# Patient Record
Sex: FEMALE | Race: WHITE | Employment: FULL TIME | URBAN - METROPOLITAN AREA
[De-identification: names, ages, dates, MRNs, and addresses within clinical notes are randomized per-mention and may not be internally consistent; named-entity substitution may affect disease eponyms.]

---

## 2017-03-13 ENCOUNTER — NURSE ONLY (OUTPATIENT)
Dept: FAMILY MEDICINE CLINIC | Age: 42
End: 2017-03-13

## 2017-03-13 DIAGNOSIS — Z11.1 PPD SCREENING TEST: Primary | ICD-10-CM

## 2017-03-13 PROCEDURE — 86580 TB INTRADERMAL TEST: CPT | Performed by: FAMILY MEDICINE

## 2017-03-15 ENCOUNTER — OFFICE VISIT (OUTPATIENT)
Dept: FAMILY MEDICINE CLINIC | Age: 42
End: 2017-03-15

## 2017-03-15 VITALS
HEIGHT: 64 IN | DIASTOLIC BLOOD PRESSURE: 74 MMHG | TEMPERATURE: 101.9 F | RESPIRATION RATE: 24 BRPM | OXYGEN SATURATION: 96 % | WEIGHT: 240 LBS | HEART RATE: 102 BPM | SYSTOLIC BLOOD PRESSURE: 122 MMHG | BODY MASS INDEX: 40.97 KG/M2

## 2017-03-15 DIAGNOSIS — K50.90 CROHN'S DISEASE WITHOUT COMPLICATION, UNSPECIFIED GASTROINTESTINAL TRACT LOCATION (HCC): ICD-10-CM

## 2017-03-15 DIAGNOSIS — J11.1 FLU: Primary | ICD-10-CM

## 2017-03-15 DIAGNOSIS — K76.0 FATTY LIVER: ICD-10-CM

## 2017-03-15 LAB
INDURATION: 0
INFLUENZA A ANTIBODY: ABNORMAL
INFLUENZA B ANTIBODY: POSITIVE
TB SKIN TEST: NORMAL

## 2017-03-15 PROCEDURE — 87804 INFLUENZA ASSAY W/OPTIC: CPT | Performed by: INTERNAL MEDICINE

## 2017-03-15 PROCEDURE — 99214 OFFICE O/P EST MOD 30 MIN: CPT | Performed by: INTERNAL MEDICINE

## 2017-03-15 RX ORDER — BENZONATATE 100 MG/1
CAPSULE ORAL
Qty: 90 CAPSULE | Refills: 0 | Status: SHIPPED | OUTPATIENT
Start: 2017-03-15 | End: 2017-12-24

## 2017-03-15 RX ORDER — OSELTAMIVIR PHOSPHATE 75 MG/1
CAPSULE ORAL
Qty: 10 CAPSULE | Refills: 0 | Status: SHIPPED | OUTPATIENT
Start: 2017-03-15 | End: 2017-05-10

## 2017-05-10 ENCOUNTER — OFFICE VISIT (OUTPATIENT)
Dept: FAMILY MEDICINE CLINIC | Age: 42
End: 2017-05-10

## 2017-05-10 VITALS
BODY MASS INDEX: 41.32 KG/M2 | HEART RATE: 78 BPM | SYSTOLIC BLOOD PRESSURE: 136 MMHG | DIASTOLIC BLOOD PRESSURE: 78 MMHG | HEIGHT: 64 IN | WEIGHT: 242 LBS

## 2017-05-10 DIAGNOSIS — E66.01 MORBID OBESITY DUE TO EXCESS CALORIES (HCC): ICD-10-CM

## 2017-05-10 DIAGNOSIS — R51.9 ACUTE INTRACTABLE HEADACHE, UNSPECIFIED HEADACHE TYPE: Primary | ICD-10-CM

## 2017-05-10 DIAGNOSIS — K50.90 CROHN'S DISEASE WITHOUT COMPLICATION, UNSPECIFIED GASTROINTESTINAL TRACT LOCATION (HCC): ICD-10-CM

## 2017-05-10 PROCEDURE — 99214 OFFICE O/P EST MOD 30 MIN: CPT | Performed by: FAMILY MEDICINE

## 2017-06-15 ENCOUNTER — TELEPHONE (OUTPATIENT)
Dept: FAMILY MEDICINE CLINIC | Age: 42
End: 2017-06-15

## 2017-06-15 DIAGNOSIS — Z00.00 WELL ADULT EXAM: Primary | ICD-10-CM

## 2017-12-24 RX ORDER — LEVOFLOXACIN 500 MG/1
500 TABLET, FILM COATED ORAL DAILY
Qty: 10 TABLET | Refills: 0 | Status: SHIPPED | OUTPATIENT
Start: 2017-12-24 | End: 2018-01-03

## 2017-12-24 RX ORDER — BENZONATATE 200 MG/1
200 CAPSULE ORAL 3 TIMES DAILY PRN
Qty: 20 CAPSULE | Refills: 0 | Status: SHIPPED | OUTPATIENT
Start: 2017-12-24 | End: 2017-12-31

## 2018-05-14 ENCOUNTER — TELEPHONE (OUTPATIENT)
Dept: FAMILY MEDICINE CLINIC | Age: 43
End: 2018-05-14

## 2018-08-09 ENCOUNTER — PATIENT MESSAGE (OUTPATIENT)
Dept: FAMILY MEDICINE CLINIC | Age: 43
End: 2018-08-09

## 2018-08-15 ENCOUNTER — TELEPHONE (OUTPATIENT)
Dept: ORTHOPEDIC SURGERY | Age: 43
End: 2018-08-15

## 2018-10-08 RX ORDER — TRETINOIN 0.25 MG/G
CREAM TOPICAL
Qty: 20 G | Refills: 3 | Status: SHIPPED | OUTPATIENT
Start: 2018-10-08 | End: 2018-12-26 | Stop reason: SDUPTHER

## 2018-10-08 NOTE — TELEPHONE ENCOUNTER
[facility]     Last Office Visit- 5/10/17     Pending Office Visit- none

## 2020-02-12 ENCOUNTER — TELEPHONE (OUTPATIENT)
Dept: PRIMARY CARE CLINIC | Age: 45
End: 2020-02-12

## 2020-02-12 NOTE — TELEPHONE ENCOUNTER
Please call: I have not seen her in quite some time and she had labs drawn that were sent to me that require follow-up. I see no future appointments.     Please ask her to follow-up with me or with a physician at the dent office on her abnormal labs in the next couple weeks

## 2020-02-18 ENCOUNTER — OFFICE VISIT (OUTPATIENT)
Dept: PRIMARY CARE CLINIC | Age: 45
End: 2020-02-18
Payer: COMMERCIAL

## 2020-02-18 VITALS
HEART RATE: 86 BPM | WEIGHT: 252 LBS | DIASTOLIC BLOOD PRESSURE: 74 MMHG | SYSTOLIC BLOOD PRESSURE: 126 MMHG | BODY MASS INDEX: 43.02 KG/M2 | HEIGHT: 64 IN

## 2020-02-18 DIAGNOSIS — R73.09 ELEVATED GLUCOSE: ICD-10-CM

## 2020-02-18 LAB
CHOLESTEROL, TOTAL: 184 MG/DL (ref 0–199)
HBA1C MFR BLD: 6.1 %
HDLC SERPL-MCNC: 43 MG/DL (ref 40–60)
LDL CHOLESTEROL CALCULATED: 110 MG/DL
TRIGL SERPL-MCNC: 155 MG/DL (ref 0–150)
VLDLC SERPL CALC-MCNC: 31 MG/DL

## 2020-02-18 PROCEDURE — 99396 PREV VISIT EST AGE 40-64: CPT | Performed by: FAMILY MEDICINE

## 2020-02-18 PROCEDURE — 83036 HEMOGLOBIN GLYCOSYLATED A1C: CPT | Performed by: FAMILY MEDICINE

## 2020-02-18 RX ORDER — MERCAPTOPURINE 50 MG/1
50 TABLET ORAL
COMMUNITY
Start: 2020-01-17 | End: 2021-04-26

## 2020-02-18 RX ORDER — TRIAMCINOLONE ACETONIDE 1 MG/G
CREAM TOPICAL
COMMUNITY
Start: 2020-01-31 | End: 2021-04-26

## 2020-02-18 ASSESSMENT — ENCOUNTER SYMPTOMS
ABDOMINAL PAIN: 0
COUGH: 0
NAUSEA: 0
EYE PAIN: 0
CONSTIPATION: 0
SHORTNESS OF BREATH: 0
SORE THROAT: 0
RHINORRHEA: 0
DIARRHEA: 0
VOMITING: 0
COLOR CHANGE: 0

## 2020-02-18 ASSESSMENT — PATIENT HEALTH QUESTIONNAIRE - PHQ9
SUM OF ALL RESPONSES TO PHQ9 QUESTIONS 1 & 2: 0
SUM OF ALL RESPONSES TO PHQ QUESTIONS 1-9: 0
1. LITTLE INTEREST OR PLEASURE IN DOING THINGS: 0
SUM OF ALL RESPONSES TO PHQ QUESTIONS 1-9: 0
2. FEELING DOWN, DEPRESSED OR HOPELESS: 0

## 2020-02-18 NOTE — PATIENT INSTRUCTIONS
If you want to feel better these are the FUNDAMENTAL PILLARS of Wellness:    Make it EASY to do the RIGHT THINGS. 1)  You can choose to Get 150 min/week of moderate exercise (can talk but can't sing) or 75 min/week of vigorous exercise (can't talk)   This will enhance your sense of well being (Exercise is as good as medicine for depression.)    2)  You can choose to Get 7-9 hours of sleep per night    Detoxifies your brain, reduces risk of dementia    3)  You can choose to Strength Train 2 x a week on non-consecutive days   This will improve function and reduce risk of injury. Body weight type exercises such as Yoga and Pilates are good    4)  You can choose good nutrition. Only eat your goal weight (in lbs) x 10 calories/day and get 5 servings of Vegetables/day   Plant based diets reduce risk of heart attack/stroke and will help you feel full on less food. Avoid highly processed foods and processed carbohydrates. 5)  You can choose moderate alcohol intake < 1-2 drinks/day   Alcohol will disrupt your sleep and add calories to your day    6)  You can choose to develop a Charismatic/Supportive relationship. This will strengthen your resilience for the ups and downs. 7)  You can choose to Practice Mindfulness. An hour a day of prayer/meditation/gratitude will change your life! Here are some recommendations for weight loss:  Check into The GI Diet or \"Primal diet\", Intermittent fasting. Take your desired weight in pounds and multiply by 10 and that is your average daily calorie allowance. For example if you wish to weigh 170 lb x 10 = 1700 sandie/day (this is how to gradually lose the weight and maintain your desired weight). Avoid soda/coke and all \"wet carbs\" => Drink ice water instead    Drink a large glass of ice water before meals and EAT SLOWLY (talk while you eat)! Rethink your hunger => it means your losing weight. Minimize carbohydrates as they stimulate your appetite.   Avoid Bread, Rice, Pasta and Potatoes      Avoid eating calories after 6 pm  Try taking your calories only in 6 hours of the day - fast the rest of the day

## 2020-12-28 ENCOUNTER — TELEPHONE (OUTPATIENT)
Dept: PRIMARY CARE CLINIC | Age: 45
End: 2020-12-28

## 2020-12-28 NOTE — TELEPHONE ENCOUNTER
----- Message from Inocencia Taylor sent at 12/28/2020 12:04 PM EST -----  Subject: Message to Provider    QUESTIONS  Information for Provider? patient wants to receive monoclonal antibody  ---------------------------------------------------------------------------  --------------  CALL BACK INFO  What is the best way for the office to contact you? OK to leave message on   voicemail  Preferred Call Back Phone Number? [phone]  ---------------------------------------------------------------------------  --------------  SCRIPT ANSWERS  Relationship to Patient?  Self

## 2020-12-29 ENCOUNTER — VIRTUAL VISIT (OUTPATIENT)
Dept: PRIMARY CARE CLINIC | Age: 45
End: 2020-12-29
Payer: COMMERCIAL

## 2020-12-29 PROCEDURE — 99213 OFFICE O/P EST LOW 20 MIN: CPT | Performed by: FAMILY MEDICINE

## 2020-12-29 ASSESSMENT — ENCOUNTER SYMPTOMS
ABDOMINAL PAIN: 0
DIARRHEA: 0
NAUSEA: 0
COUGH: 1
VOMITING: 0
SHORTNESS OF BREATH: 0
CONSTIPATION: 0

## 2021-04-20 ENCOUNTER — PATIENT MESSAGE (OUTPATIENT)
Dept: PRIMARY CARE CLINIC | Age: 46
End: 2021-04-20

## 2021-04-20 NOTE — TELEPHONE ENCOUNTER
From: Amy Linn  To: Matthew Johnson MD  Sent: 4/20/2021 8:00 AM EDT  Subject: Visit Aaron Armendariz, and I are all coming in for our physicals. [facility] and I are scheduled Monday 4/26. Kiana Ro is 5/4. Do you want us to pre-complete blood work prior to our visit?     Thanks,  Wendie Marvin

## 2021-04-26 ENCOUNTER — OFFICE VISIT (OUTPATIENT)
Dept: PRIMARY CARE CLINIC | Age: 46
End: 2021-04-26
Payer: COMMERCIAL

## 2021-04-26 VITALS
SYSTOLIC BLOOD PRESSURE: 128 MMHG | TEMPERATURE: 98.1 F | HEART RATE: 74 BPM | WEIGHT: 230.2 LBS | BODY MASS INDEX: 40.79 KG/M2 | DIASTOLIC BLOOD PRESSURE: 78 MMHG | HEIGHT: 63 IN

## 2021-04-26 DIAGNOSIS — K50.90 CROHN'S DISEASE WITHOUT COMPLICATION, UNSPECIFIED GASTROINTESTINAL TRACT LOCATION (HCC): ICD-10-CM

## 2021-04-26 DIAGNOSIS — Z82.49 FAMILY HISTORY OF CORONARY ARTERY DISEASE: ICD-10-CM

## 2021-04-26 DIAGNOSIS — Z13.220 SCREENING CHOLESTEROL LEVEL: ICD-10-CM

## 2021-04-26 DIAGNOSIS — Z00.00 WELL ADULT EXAM: Primary | ICD-10-CM

## 2021-04-26 DIAGNOSIS — R07.9 CHEST PAIN, UNSPECIFIED TYPE: ICD-10-CM

## 2021-04-26 DIAGNOSIS — R73.09 ELEVATED GLUCOSE: ICD-10-CM

## 2021-04-26 DIAGNOSIS — E66.01 MORBID OBESITY DUE TO EXCESS CALORIES (HCC): ICD-10-CM

## 2021-04-26 PROCEDURE — 93000 ELECTROCARDIOGRAM COMPLETE: CPT | Performed by: FAMILY MEDICINE

## 2021-04-26 PROCEDURE — 99396 PREV VISIT EST AGE 40-64: CPT | Performed by: FAMILY MEDICINE

## 2021-04-26 PROCEDURE — 99214 OFFICE O/P EST MOD 30 MIN: CPT | Performed by: FAMILY MEDICINE

## 2021-04-26 RX ORDER — ERYTHROMYCIN 20 MG/ML
SOLUTION TOPICAL
COMMUNITY
Start: 2020-09-02 | End: 2021-09-02

## 2021-04-26 RX ORDER — AZITHROMYCIN 250 MG/1
500 TABLET, FILM COATED ORAL DAILY
COMMUNITY

## 2021-04-26 ASSESSMENT — ENCOUNTER SYMPTOMS
EYE PAIN: 0
SORE THROAT: 0
VOMITING: 0
ABDOMINAL PAIN: 0
SHORTNESS OF BREATH: 0
CONSTIPATION: 0
RHINORRHEA: 0
COLOR CHANGE: 0
NAUSEA: 0
COUGH: 0
DIARRHEA: 0

## 2021-04-26 ASSESSMENT — PATIENT HEALTH QUESTIONNAIRE - PHQ9
1. LITTLE INTEREST OR PLEASURE IN DOING THINGS: 0
SUM OF ALL RESPONSES TO PHQ QUESTIONS 1-9: 0
2. FEELING DOWN, DEPRESSED OR HOPELESS: 0

## 2021-04-26 NOTE — PATIENT INSTRUCTIONS
appropriate for everybody. ..  good online sources include Noom (more social with daily check ins), Lifesum (similar but less social) and Naturally slim, as well as Brandneu ($1500)    The GI Diet or \"Primal diet\", Intermittent fasting can also be effective choices. If you have diabetes treated with insulin be sure to ask me for specific guidance around meals. Take your desired weight in pounds and multiply by 10 and that is your average daily calorie allowance. For example if you wish to weigh 170 lb x 10 = 1700 sandie/day (this is how to gradually lose the weight and maintain your desired weight). Avoid soda/coke and all \"wet carbs\" => Drink ice water instead    Drink a large glass of ice water before meals and EAT SLOWLY (talk while you eat)! Rethink your hunger => it means your losing weight. Minimize highly processed carbohydrates as they stimulate your appetite:  Specifically cut back on Bread, Rice, Pasta and Potatoes    Avoid eating calories after 6 pm      Just How Much of a Benefit Do We Get from a Healthful Lifestyle? Honey Beckett MD reviewing Rosemarie Apple al. BMJ 2020 Jan 8  A long-term analysis suggests that adopting such a lifestyle at midlife might add as long as 10 years of disease-free life. Virtually everyone knows that a healthful lifestyle  never smoking, normal body-mass index (BMI), moderate-to-vigorous physical activity, moderate alcohol intake, and a higher-quality diet  is good for their health. What very few people know is just how much benefit they get from achieving all these lifestyle goals. A [place] team examined data from about 111,000 people at age 48 and followed them prospectively for as long as 34 years. Healthful lifestyle factors were measured repeatedly and systematically, and development of various diseases and death were recorded. The primary endpoint was life expectancy free from diabetes, cardiovascular diseases, and cancer.      Women who met all the healthful lifestyle measures had an additional 10.7 years of disease-free life compared with women who met no healthful lifestyle measures. For men, the number was 7.6 additional disease-free years.

## 2021-04-26 NOTE — PROGRESS NOTES
Chief Complaint   Patient presents with    Annual Exam       Vandana Rodríguez presents for evaluation and management of all adult exam and follow-up on obesity elevated glucose Crohn's and the sudden cardiac death of her 15-year-old brother. Dharmesh Candelario notes that she is walking about 10,000 steps a day. She does not do any formal strength training but is remodeling a home with her . She gets about 7 hours asleep at night. She very rarely drinks any alcohol. She gets about 5-6 servings of vegetables a day and she has excellent social support. She is upset today as her brother who is only 46years old, recently had a sudden cardiac death. She notes that she has a paternal uncle who also had sudden cardiac death. She has been working on weight loss and has affected a 20 pound weight loss through diet and increased activity. She continues to follow with Dr. Jennifer Ma for Crohn's disease which is stable on just Remicade. Notes no blurred vision or other medical problems today. She has a history of elevated glucose but was unable to get a point-of-care hemoglobin test today due to low hemoglobin. Review of Systems   Constitutional: Negative for chills and fever. HENT: Negative for ear pain, rhinorrhea and sore throat. Eyes: Negative for pain and visual disturbance. Respiratory: Negative for cough and shortness of breath. Cardiovascular: Positive for chest pain. Negative for palpitations. Gastrointestinal: Negative for abdominal pain, constipation, diarrhea, nausea and vomiting. Genitourinary: Negative for dysuria and frequency. Musculoskeletal: Negative for joint swelling and myalgias. Skin: Negative for color change and rash. Neurological: Negative for weakness, numbness and headaches. Hematological: Negative for adenopathy. Does not bruise/bleed easily. Psychiatric/Behavioral: Negative for dysphoric mood, self-injury and suicidal ideas.  The patient is not nervous/anxious. Allergies   Allergen Reactions    Minocycline Hives    Oxycodone-Acetaminophen Itching    Pcn [Penicillins] Hives    Codeine Rash     New Prescriptions    No medications on file     Current Outpatient Medications   Medication Sig Dispense Refill    azithromycin (ZITHROMAX) 250 MG tablet Take 500 mg by mouth daily Indications: one for 3 consecutive days, for 12 weeks      hydrocortisone 2.5 % cream APPLY TOPICALLY TO AFFECTED AREA(S) TWO TIMES A DAY FOR 14 DAYS OR UNTIL IMPROVED 28 g 2    ibuprofen (ADVIL;MOTRIN) 200 MG tablet Take 200 mg by mouth every 6 hours as needed for Pain      inFLIXimab (REMICADE) 100 MG injection Infuse 5 mg/kg intravenously See Admin Instructions.  erythromycin with ethanol (THERAMYCIN) 2 % external solution Apply to face bid starting in .  triamcinolone (KENALOG) 0.1 % cream by Intratympanic route       No current facility-administered medications for this visit.         Past Medical History:   Diagnosis Date    Allergic rhinitis     COVID-19 2020    Crohn disease ([facility] [address]     Dr. Butch Santiago liver     with elevated LFT - [facility]    Morbid obesity due to excess calories ([facility] [address] 10/21/2016    Trigger thumb of right hand 2014     Past Surgical History:   Procedure Laterality Date     SECTION      x2    ENDOMETRIAL ABLATION      Dr. Steve Jackson EXTRACTION       Family History   Problem Relation Age of Onset    High Blood Pressure Mother     Cancer Mother         BCC per PT     Diabetes Brother     Heart Attack Brother     Cancer Maternal Grandmother         uterine, in [de-identified]    Heart Disease Maternal Grandfather     Pancreatic Cancer Father         Stage IV:  BRCA    No Known Problems Son         Comp Science/Engrg    Pancreatic Cancer Paternal Grandmother      Social History     Tobacco Use    Smoking status: Former Smoker     Packs/day: 0.50     Years: 5.00 Pack years: 2.50     Quit date: 1999     Years since quittin.4    Smokeless tobacco: Never Used   Substance Use Topics    Alcohol use: Yes     Comment: occasional    Drug use: No       Objective   /78   Pulse 74   Temp 98.1 °F (36.7 °C) (Temporal)   Ht 5' 3\" (1.6 m)   Wt 230 lb 3.2 oz (104.4 kg)   Breastfeeding No   BMI 40.78 kg/m²   Wt Readings from Last 3 Encounters:   21 230 lb 3.2 oz (104.4 kg)   20 252 lb (114.3 kg)   05/10/17 242 lb (109.8 kg)       Physical Exam  Constitutional:       Appearance: She is well-developed. She is obese. HENT:      Head: Normocephalic and atraumatic. Nose: Nose normal.      Mouth/Throat:      Pharynx: No oropharyngeal exudate. Eyes:      General: No scleral icterus. Right eye: No discharge. Left eye: No discharge. Pupils: Pupils are equal, round, and reactive to light. Neck:      Musculoskeletal: Normal range of motion and neck supple. Thyroid: No thyromegaly. Cardiovascular:      Rate and Rhythm: Normal rate and regular rhythm. Pulses:           Dorsalis pedis pulses are 2+ on the right side and 2+ on the left side. Posterior tibial pulses are 2+ on the right side and 2+ on the left side. Heart sounds: Normal heart sounds. No murmur. No friction rub. No gallop. Comments: No Edema Lower Extremities  Pulmonary:      Effort: Pulmonary effort is normal.      Breath sounds: Normal breath sounds. No wheezing or rales. Abdominal:      General: Bowel sounds are normal. There is no distension. Palpations: Abdomen is soft. There is no hepatomegaly or splenomegaly. Tenderness: There is no abdominal tenderness. There is no guarding or rebound. Musculoskeletal: Normal range of motion. General: No tenderness or deformity. Lymphadenopathy:      Cervical: No cervical adenopathy. Skin:     General: Skin is warm and dry. Findings: No erythema or rash.    Neurological: Mental Status: She is alert. Cranial Nerves: No cranial nerve deficit. Sensory: No sensory deficit. Gait: Gait normal.   Psychiatric:         Speech: Speech normal.         Behavior: Behavior normal.           Chemistry        Component Value Date/Time     02/11/2020 1445    K 3.7 02/11/2020 1445     02/11/2020 1445    CO2 27 02/11/2020 1445    BUN 11 02/11/2020 1445    CREATININE 0.77 02/11/2020 1445        Component Value Date/Time    CALCIUM 9.1 02/11/2020 1445    ALKPHOS 67 02/11/2020 1445    AST 23 02/11/2020 1445    ALT 35 02/11/2020 1445    BILITOT 0.4 02/11/2020 1445          Lab Results   Component Value Date    WBC 10.0 02/11/2020    HGB 12.1 02/11/2020    HCT 37.3 02/11/2020    MCV 81.3 (L) 02/11/2020     (H) 02/11/2020     Lab Results   Component Value Date    LABA1C 6.1 02/18/2020     No results found for: EAG  Lab Results   Component Value Date    LABA1C 6.1 02/18/2020     No components found for: CHLPL  Lab Results   Component Value Date    TRIG 155 (H) 02/18/2020    TRIG 121 09/22/2016    TRIG 115 11/11/2011     Lab Results   Component Value Date    HDL 43 02/18/2020    HDL 40 09/22/2016    HDL 54 11/11/2011     Lab Results   Component Value Date    LDLCALC 110 (H) 02/18/2020    LDLCALC 108 09/22/2016    LDLCALC 121 (H) 11/11/2011     Lab Results   Component Value Date    LABVLDL 31 02/18/2020    LABVLDL 23 11/11/2011     ECG: Normal sinus rhythm with low voltage otherwise normal    Assessment   Plan   1. Well adult exam: Appears well. Set goals for healthcare and gave counseling on lifestyle  2. Morbid obesity due to excess calories ([address]  Counseled on diet and exercise  3. Crohn's disease without complication, unspecified gastrointestinal tract location ([address]  Stable: Continue follow-up with GI and meds  4. Elevated glucose  -     We will check lab based hemoglobin A1c due to history of elevated glucose Hemoglobin A1C; Future  5.  Screening cholesterol

## 2021-04-29 DIAGNOSIS — R73.09 ELEVATED GLUCOSE: ICD-10-CM

## 2021-04-29 DIAGNOSIS — Z13.220 SCREENING CHOLESTEROL LEVEL: ICD-10-CM

## 2021-04-29 LAB
A/G RATIO: 1.5 (ref 1.1–2.2)
ALBUMIN SERPL-MCNC: 4.1 G/DL (ref 3.4–5)
ALP BLD-CCNC: 67 U/L (ref 40–129)
ALT SERPL-CCNC: 14 U/L (ref 10–40)
ANION GAP SERPL CALCULATED.3IONS-SCNC: 12 MMOL/L (ref 3–16)
AST SERPL-CCNC: 19 U/L (ref 15–37)
BILIRUB SERPL-MCNC: 0.7 MG/DL (ref 0–1)
BUN BLDV-MCNC: 12 MG/DL (ref 7–20)
CALCIUM SERPL-MCNC: 8.8 MG/DL (ref 8.3–10.6)
CHLORIDE BLD-SCNC: 101 MMOL/L (ref 99–110)
CHOLESTEROL, TOTAL: 142 MG/DL (ref 0–199)
CO2: 24 MMOL/L (ref 21–32)
CREAT SERPL-MCNC: 0.9 MG/DL (ref 0.6–1.1)
GFR AFRICAN AMERICAN: >60
GFR NON-AFRICAN AMERICAN: >60
GLOBULIN: 2.7 G/DL
GLUCOSE BLD-MCNC: 83 MG/DL (ref 70–99)
HDLC SERPL-MCNC: 40 MG/DL (ref 40–60)
LDL CHOLESTEROL CALCULATED: 74 MG/DL
POTASSIUM SERPL-SCNC: 4 MMOL/L (ref 3.5–5.1)
SODIUM BLD-SCNC: 137 MMOL/L (ref 136–145)
TOTAL PROTEIN: 6.8 G/DL (ref 6.4–8.2)
TRIGL SERPL-MCNC: 139 MG/DL (ref 0–150)
VLDLC SERPL CALC-MCNC: 28 MG/DL

## 2021-04-30 LAB
ESTIMATED AVERAGE GLUCOSE: 105.4 MG/DL
HBA1C MFR BLD: 5.3 %

## 2021-05-06 ENCOUNTER — HOSPITAL ENCOUNTER (OUTPATIENT)
Dept: CT IMAGING | Age: 46
Discharge: HOME | End: 2021-05-06
Payer: COMMERCIAL

## 2021-05-06 DIAGNOSIS — R07.9 CHEST PAIN, UNSPECIFIED TYPE: ICD-10-CM

## 2021-05-06 DIAGNOSIS — Z82.49 FAMILY HISTORY OF CORONARY ARTERY DISEASE: ICD-10-CM

## 2021-05-06 PROCEDURE — 75571 CT HRT W/O DYE W/CA TEST: CPT

## 2021-09-20 ENCOUNTER — OFFICE VISIT (OUTPATIENT)
Dept: PRIMARY CARE CLINIC | Age: 46
End: 2021-09-20
Payer: COMMERCIAL

## 2021-09-20 VITALS
DIASTOLIC BLOOD PRESSURE: 82 MMHG | SYSTOLIC BLOOD PRESSURE: 119 MMHG | HEIGHT: 63 IN | HEART RATE: 72 BPM | BODY MASS INDEX: 36.86 KG/M2 | WEIGHT: 208 LBS

## 2021-09-20 DIAGNOSIS — Z82.49 FAMILY HISTORY OF CORONARY ARTERY DISEASE: ICD-10-CM

## 2021-09-20 DIAGNOSIS — L71.9 ROSACEA: ICD-10-CM

## 2021-09-20 DIAGNOSIS — K50.90 CROHN'S DISEASE WITHOUT COMPLICATION, UNSPECIFIED GASTROINTESTINAL TRACT LOCATION (HCC): Primary | ICD-10-CM

## 2021-09-20 DIAGNOSIS — E66.01 MORBID OBESITY DUE TO EXCESS CALORIES (HCC): ICD-10-CM

## 2021-09-20 PROCEDURE — 99214 OFFICE O/P EST MOD 30 MIN: CPT | Performed by: FAMILY MEDICINE

## 2021-09-20 RX ORDER — AZELAIC ACID 0.15 G/G
GEL TOPICAL
COMMUNITY
Start: 2021-07-09 | End: 2021-09-20

## 2021-09-20 ASSESSMENT — PATIENT HEALTH QUESTIONNAIRE - PHQ9
SUM OF ALL RESPONSES TO PHQ QUESTIONS 1-9: 0
SUM OF ALL RESPONSES TO PHQ QUESTIONS 1-9: 0
SUM OF ALL RESPONSES TO PHQ9 QUESTIONS 1 & 2: 0
1. LITTLE INTEREST OR PLEASURE IN DOING THINGS: 0
2. FEELING DOWN, DEPRESSED OR HOPELESS: 0
SUM OF ALL RESPONSES TO PHQ QUESTIONS 1-9: 0

## 2021-09-20 NOTE — PROGRESS NOTES
Chief Complaint   Patient presents with    Crohn's Disease    Obesity    Rosacea       HPI: Dena Gaston presents for evaluation and management of obesity, Crohn's and rosacea. Dena Gaston notes that she has been on azithromycin for 2 months now for her rosacea and it is much better. She continues to follow Dr. Leonor Pacheco for her Crohn's disease. She notes a sensation of something stuck in her throat. She admits that she only takes her pills with a sip of water. She has been working on diet and exercise and continues to lose weight. We reviewed her coronary calcium score which was normal and cholesterol was good    Today's PHQ:    PHQ Scores 9/20/2021 4/26/2021 2/18/2020   PHQ2 Score 0 0 0   PHQ9 Score 0 0 0     Interpretation of Total Score Depression Severity: 1-4 = Minimal depression, 5-9 = Mild depression, 10-14 = Moderate depression, 15-19 = Moderately severe depression, 20-27 = Severe depression        Review of Systems    Allergies   Allergen Reactions    Minocycline Hives    Oxycodone-Acetaminophen Itching    Pcn [Penicillins] Hives    Codeine Rash     New Prescriptions    No medications on file     Current Outpatient Medications   Medication Sig Dispense Refill    azithromycin (ZITHROMAX) 250 MG tablet Take 500 mg by mouth daily Indications: one for 3 consecutive days, for 12 weeks      ibuprofen (ADVIL;MOTRIN) 200 MG tablet Take 200 mg by mouth every 6 hours as needed for Pain      inFLIXimab (REMICADE) 100 MG injection Infuse 5 mg/kg intravenously See Admin Instructions.  Azelaic Acid 15 % GEL Apply to face bid 20-30 minutes after washing face (Patient not taking: Reported on 9/20/2021)      hydrocortisone 2.5 % cream APPLY TOPICALLY TO AFFECTED AREA(S) TWO TIMES A DAY FOR 14 DAYS OR UNTIL IMPROVED (Patient not taking: Reported on 9/20/2021) 28 g 2     No current facility-administered medications for this visit.        Past Medical History:   Diagnosis Date    Allergic rhinitis     COVID-19 12/28/2020    Crohn disease ([facility] [address]     Dr. Donneta Holter liver     with elevated LFT - [facility]    Morbid obesity due to excess calories ([facility] 10/21/2016    Trigger thumb of right hand 11/12/2014         Objective   /82   Pulse 72   Ht 5' 3\" (1.6 m)   Wt 208 lb (94.3 kg)   BMI 36.85 kg/m²   Wt Readings from Last 3 Encounters:   09/20/21 208 lb (94.3 kg)   04/26/21 230 lb 3.2 oz (104.4 kg)   02/18/20 252 lb (114.3 kg)       Physical Exam  Constitutional:       Appearance: She is well-developed. She is obese. Cardiovascular:      Rate and Rhythm: Normal rate and regular rhythm. Pulses:           Dorsalis pedis pulses are 2+ on the right side and 2+ on the left side. Posterior tibial pulses are 2+ on the right side and 2+ on the left side. Heart sounds: No murmur heard. No friction rub. No gallop. Comments: No Lower Extremity Edema  Pulmonary:      Effort: Pulmonary effort is normal.      Breath sounds: Normal breath sounds. No wheezing or rales. Abdominal:      General: Bowel sounds are normal. There is no distension. Palpations: Abdomen is soft. There is no mass. Tenderness: There is no abdominal tenderness. Skin:     General: Skin is warm and dry. Findings: No rash.       Comments: Few scattered acneiform lesions on face           Chemistry        Component Value Date/Time     04/29/2021 0726    K 4.0 04/29/2021 0726     04/29/2021 0726    CO2 24 04/29/2021 0726    BUN 12 04/29/2021 0726    CREATININE 0.9 04/29/2021 0726        Component Value Date/Time    CALCIUM 8.8 04/29/2021 0726    ALKPHOS 67 04/29/2021 0726    AST 19 04/29/2021 0726    ALT 14 04/29/2021 0726    BILITOT 0.7 04/29/2021 0726          Lab Results   Component Value Date    WBC 10.0 02/11/2020    HGB 12.1 02/11/2020    HCT 37.3 02/11/2020    MCV 81.3 (L) 02/11/2020     (H) 02/11/2020     Lab Results   Component Value Date    LABA1C 5.3 04/29/2021     Lab Results   Component Value Date    .4 04/29/2021     Lab Results   Component Value Date    LABA1C 5.3 04/29/2021     No components found for: CHLPL  Lab Results   Component Value Date    TRIG 139 04/29/2021    TRIG 155 (H) 02/18/2020    TRIG 121 09/22/2016     Lab Results   Component Value Date    HDL 40 04/29/2021    HDL 43 02/18/2020    HDL 40 09/22/2016     Lab Results   Component Value Date    LDLCALC 74 04/29/2021    LDLCALC 110 (H) 02/18/2020    LDLCALC 108 09/22/2016     Lab Results   Component Value Date    LABVLDL 28 04/29/2021    LABVLDL 31 02/18/2020    LABVLDL 23 11/11/2011         Assessment   Plan   1. Crohn's disease without complication, unspecified gastrointestinal tract location [facility])  Patient is getting her labs checked in the Connect system. We reviewed those. I asked patient to mention to Dr. Dusty Kramer her symptoms of dysphagia    2. Morbid obesity due to excess calories ([address]  Praised efforts at diet and weight loss    3. Family history of coronary artery disease  Reviewed coronary calcium score which was normal    4. Rosacea  Improved: Reviewed her labs for drug monitoring. If she has not had an EKG by the time we see her back we will check an EKG in 6 months     Discussed use, benefit, and side effects of prescribed medications. Barriers to medication compliance addressed. All patient questions answered. Pt voiced understanding. RTC Return in about 6 months (around 3/20/2022).

## 2022-02-01 LAB — HCT VFR BLD CALC: 39.2 % (ref 36–46)

## 2022-03-23 ENCOUNTER — PATIENT MESSAGE (OUTPATIENT)
Dept: PRIMARY CARE CLINIC | Age: 47
End: 2022-03-23

## 2022-03-23 DIAGNOSIS — Z13.220 SCREENING CHOLESTEROL LEVEL: Primary | ICD-10-CM

## 2022-03-23 NOTE — TELEPHONE ENCOUNTER
From: Duc Hernandez  To: Dr. Nilo Whitman: 3/23/2022 12:50 PM EDT  Subject: Pre physical blood work    Hi Dr Dayana Soni,    Can you order lab work prior to our 4/7 annual physical visit.

## 2022-04-02 DIAGNOSIS — Z13.220 SCREENING CHOLESTEROL LEVEL: ICD-10-CM

## 2022-04-02 LAB
A/G RATIO: 1.4 (ref 1.1–2.2)
ALBUMIN SERPL-MCNC: 4.4 G/DL (ref 3.4–5)
ALP BLD-CCNC: 65 U/L (ref 40–129)
ALT SERPL-CCNC: 12 U/L (ref 10–40)
ANION GAP SERPL CALCULATED.3IONS-SCNC: 14 MMOL/L (ref 3–16)
AST SERPL-CCNC: 16 U/L (ref 15–37)
BILIRUB SERPL-MCNC: 0.5 MG/DL (ref 0–1)
BUN BLDV-MCNC: 17 MG/DL (ref 7–20)
CALCIUM SERPL-MCNC: 9.3 MG/DL (ref 8.3–10.6)
CHLORIDE BLD-SCNC: 105 MMOL/L (ref 99–110)
CHOLESTEROL, TOTAL: 178 MG/DL (ref 0–199)
CO2: 20 MMOL/L (ref 21–32)
CREAT SERPL-MCNC: 0.8 MG/DL (ref 0.6–1.1)
GFR AFRICAN AMERICAN: >60
GFR NON-AFRICAN AMERICAN: >60
GLUCOSE BLD-MCNC: 94 MG/DL (ref 70–99)
HDLC SERPL-MCNC: 43 MG/DL (ref 40–60)
LDL CHOLESTEROL CALCULATED: 116 MG/DL
POTASSIUM SERPL-SCNC: 4.2 MMOL/L (ref 3.5–5.1)
SODIUM BLD-SCNC: 139 MMOL/L (ref 136–145)
TOTAL PROTEIN: 7.5 G/DL (ref 6.4–8.2)
TRIGL SERPL-MCNC: 97 MG/DL (ref 0–150)
VLDLC SERPL CALC-MCNC: 19 MG/DL

## 2022-04-04 NOTE — RESULT ENCOUNTER NOTE
Good Morning Jose Angel ,    Thanks for getting your labs drawn in preparation for our visit later this week. Your lab results are back and they look pretty good. Your cholesterol increased significantly, so likely want to talk about that just a little bit. Keep up the good work!     I look forward to seeing you in a couple days,    Dr. Lorenzo Rosas      For your convenience I have listed your future appointment(s) below:  Future Appointments  4/7/2022   4:00 PM    Cirilo Campos MD        [address] [facility]

## 2022-04-06 PROBLEM — E11.65 UNCONTROLLED TYPE 2 DIABETES MELLITUS WITH HYPERGLYCEMIA (HCC): Status: ACTIVE | Noted: 2022-04-06

## 2022-04-07 ENCOUNTER — OFFICE VISIT (OUTPATIENT)
Dept: PRIMARY CARE CLINIC | Age: 47
End: 2022-04-07
Payer: COMMERCIAL

## 2022-04-07 VITALS
HEART RATE: 84 BPM | WEIGHT: 217 LBS | BODY MASS INDEX: 38.44 KG/M2 | DIASTOLIC BLOOD PRESSURE: 82 MMHG | TEMPERATURE: 98 F | SYSTOLIC BLOOD PRESSURE: 129 MMHG

## 2022-04-07 DIAGNOSIS — Z13.1 SCREENING FOR DIABETES MELLITUS: ICD-10-CM

## 2022-04-07 DIAGNOSIS — Z00.00 ENCOUNTER FOR WELL ADULT EXAM WITHOUT ABNORMAL FINDINGS: ICD-10-CM

## 2022-04-07 DIAGNOSIS — E66.01 MORBID OBESITY DUE TO EXCESS CALORIES (HCC): ICD-10-CM

## 2022-04-07 DIAGNOSIS — Z00.00 WELL ADULT EXAM: Primary | ICD-10-CM

## 2022-04-07 LAB — HBA1C MFR BLD: NORMAL %

## 2022-04-07 PROCEDURE — 83036 HEMOGLOBIN GLYCOSYLATED A1C: CPT | Performed by: FAMILY MEDICINE

## 2022-04-07 PROCEDURE — 99396 PREV VISIT EST AGE 40-64: CPT | Performed by: FAMILY MEDICINE

## 2022-04-07 ASSESSMENT — ENCOUNTER SYMPTOMS
COLOR CHANGE: 0
RHINORRHEA: 0
SORE THROAT: 0
NAUSEA: 0
COUGH: 0
ABDOMINAL PAIN: 0
CONSTIPATION: 0
EYE PAIN: 0
DIARRHEA: 0
VOMITING: 0
SHORTNESS OF BREATH: 0

## 2022-04-07 ASSESSMENT — PATIENT HEALTH QUESTIONNAIRE - PHQ9
SUM OF ALL RESPONSES TO PHQ QUESTIONS 1-9: 0
2. FEELING DOWN, DEPRESSED OR HOPELESS: 0
SUM OF ALL RESPONSES TO PHQ9 QUESTIONS 1 & 2: 0
SUM OF ALL RESPONSES TO PHQ QUESTIONS 1-9: 0
1. LITTLE INTEREST OR PLEASURE IN DOING THINGS: 0
SUM OF ALL RESPONSES TO PHQ QUESTIONS 1-9: 0
SUM OF ALL RESPONSES TO PHQ QUESTIONS 1-9: 0

## 2022-04-07 NOTE — PROGRESS NOTES
Well Adult Note  Name: Ashely Necessary Date: 2022   MRN: [id number] Sex: Female   Age: 51 y.o. Ethnicity: Non- / Non    : 1975 Race: White (non-)      Ca Britton is here for well adult exam.  History:  She notes she is working out at [employer] 4 times a week for an hour per episode. She is getting 7-1/2 hours of sleep a night. She hardly drinks any alcohol and eats about 6 servings of vegetables a day. She continues to follow with her gastroenterologist Dr. Fausto Byrne for her Crohn's disease which she states is well controlled. She thinks she is lost about 15 pounds with her exercise and nutrition. She is pleased with those results. She had an elevated hemoglobin A1c in her chart that she states is clearly an erroneous entry. Review of Systems   Constitutional: Negative for chills and fever. HENT: Negative for ear pain, rhinorrhea and sore throat. Eyes: Negative for pain and visual disturbance. Respiratory: Negative for cough and shortness of breath. Cardiovascular: Negative for chest pain and palpitations. Gastrointestinal: Negative for abdominal pain, constipation, diarrhea, nausea and vomiting. Genitourinary: Negative for dysuria and frequency. Musculoskeletal: Negative for joint swelling and myalgias. Skin: Negative for color change and rash. Neurological: Negative for weakness, numbness and headaches. Hematological: Negative for adenopathy. Does not bruise/bleed easily. Psychiatric/Behavioral: Negative for dysphoric mood, self-injury and suicidal ideas. The patient is not nervous/anxious. Allergies   Allergen Reactions    Minocycline Hives    Oxycodone-Acetaminophen Itching    Pcn [Penicillins] Hives    Codeine Rash       Prior to Visit Medications    Medication Sig Taking?  Authorizing Provider   azithromycin (ZITHROMAX) 250 MG tablet Take 500 mg by mouth daily Indications: one for 3 consecutive days, for 12 weeks Yes Historical Provider, MD   ibuprofen (ADVIL;MOTRIN) 200 MG tablet Take 200 mg by mouth every 6 hours as needed for Pain Yes Historical Provider, MD   inFLIXimab (REMICADE) 100 MG injection Infuse 5 mg/kg intravenously See Admin Instructions. Yes Historical Provider, MD       Past Medical History:   Diagnosis Date    Allergic rhinitis     COVID-19 2020    Crohn disease ([facility] [address] [address]     Dr. Jayden Garcia liver     with elevated LFT - [place]    Morbid obesity due to excess calories ([facility] [address] 10/21/2016    Rosacea 2021    Treating with zithromax since 2021    Trigger thumb of right hand 2014    Uncontrolled type 2 diabetes mellitus with hyperglycemia ([facility] [address] 2022       Past Surgical History:   Procedure Laterality Date     SECTION      x2   Camialexandra Barahona      Dr. Tomer Baez EXTRACTION         Family History   Problem Relation Age of Onset    High Blood Pressure Mother     Cancer Mother         BCC per PT     Diabetes Brother     Heart Attack Brother     Cancer Maternal Grandmother         uterine, in [de-identified]    Heart Disease Maternal Grandfather     Pancreatic Cancer Father         Stage IV:  BRCA    No Known Problems Son         Comp Science/Engrg    Pancreatic Cancer Paternal Grandmother        Social History     Tobacco Use    Smoking status: Former Smoker     Packs/day: 0.50     Years: 5.00     Pack years: 2.50     Quit date: 1999     Years since quittin.4    Smokeless tobacco: Never Used   Substance Use Topics    Alcohol use: Yes     Comment: occasional    Drug use: No       Objective   /82   Pulse 84   Temp 98 °F (36.7 °C) (Temporal)   Wt 217 lb (98.4 kg)   Breastfeeding No   BMI 38.44 kg/m²   Wt Readings from Last 3 Encounters:   22 217 lb (98.4 kg)   21 208 lb (94.3 kg)   21 230 lb 3.2 oz (104.4 kg)     There were no vitals filed for this visit.       Physical Exam  Constitutional:       Appearance: She is well-developed. HENT:      Head: Normocephalic and atraumatic. Nose: Nose normal.      Mouth/Throat:      Pharynx: No oropharyngeal exudate. Eyes:      General: No scleral icterus. Right eye: No discharge. Left eye: No discharge. Pupils: Pupils are equal, round, and reactive to light. Neck:      Thyroid: No thyromegaly. Cardiovascular:      Rate and Rhythm: Normal rate and regular rhythm. Pulses:           Dorsalis pedis pulses are 2+ on the right side and 2+ on the left side. Posterior tibial pulses are 2+ on the right side and 2+ on the left side. Heart sounds: Normal heart sounds. No murmur heard. No friction rub. No gallop. Comments: No Edema Lower Extremities  Pulmonary:      Effort: Pulmonary effort is normal.      Breath sounds: Normal breath sounds. No wheezing or rales. Abdominal:      General: Bowel sounds are normal. There is no distension. Palpations: Abdomen is soft. There is no hepatomegaly or splenomegaly. Tenderness: There is no abdominal tenderness. There is no guarding or rebound. Musculoskeletal:         General: No tenderness or deformity. Normal range of motion. Cervical back: Normal range of motion and neck supple. Lymphadenopathy:      Cervical: No cervical adenopathy. Skin:     General: Skin is warm and dry. Findings: No erythema or rash. Neurological:      Mental Status: She is alert. Cranial Nerves: No cranial nerve deficit. Sensory: No sensory deficit. Gait: Gait normal.   Psychiatric:         Speech: Speech normal.         Behavior: Behavior normal.           Assessment   Plan   1. Well adult exam  Appears well:  Counselled diet, development, anticipatory guidance and safety issues. 2. Encounter for well adult exam without abnormal findings  As above    3. Morbid obesity due to excess calories ([address]  Counseled on nutrition and exercise. Follow-up yearly and as needed    4. Screening for diabetes mellitus  Repeat hemoglobin A1c was 5.3. Clearly the previous entry is an error we will work to have it removed  - POCT glycosylated hemoglobin (Hb A1C)      Personalized Preventive Plan   Current Health Maintenance Status  Immunization History   Administered Date(s) Administered    COVID-19, Pfizer Purple top, DILUTE for use, 12+ yrs, 30mcg/0.3mL dose 03/27/2021, 04/16/2021, 12/17/2021    Influenza 11/11/2011    Influenza Vaccine, unspecified formulation 09/22/2016    Influenza Virus Vaccine 10/20/2008, 10/05/2009, 11/04/2014, 10/05/2015    Influenza, Quadv, IM, PF (6 mo and older Fluzone, Flulaval, Fluarix, and 3 yrs and older Afluria) 11/06/2018    PPD Test 09/03/2013, 09/15/2014, 09/22/2014, 08/12/2015, 03/13/2017    Pneumococcal Conjugate 13-valent Hammad Glassing) 11/04/2014    Tdap (Boostrix, Adacel) 10/21/2008        Health Maintenance   Topic Date Due    Hepatitis C screen  Never done    Diabetic foot exam  Never done    HIV screen  Never done    Diabetic microalbuminuria test  Never done    Diabetic retinal exam  Never done    Hepatitis B vaccine (1 of 3 - Risk 3-dose series) Never done    DTaP/Tdap/Td vaccine (2 - Td or Tdap) 10/21/2018    Cervical cancer screen  09/19/2021    A1C test (Diabetic or Prediabetic)  05/01/2022    Flu vaccine (Season Ended) 09/01/2022    Depression Screen  09/20/2022    Lipid screen  04/02/2023    Colorectal Cancer Screen  12/12/2026    Pneumococcal 0-64 years Vaccine (2 of 2 - PPSV23) 07/10/2039    COVID-19 Vaccine  Completed    Hepatitis A vaccine  Aged Out    Hib vaccine  Aged Out    Meningococcal (ACWY) vaccine  Aged Out     Recommendations for Dispatch Due: see orders and patient instructions/AVS.    Return in 1 year (on 4/7/2023).

## 2022-05-10 NOTE — PATIENT INSTRUCTIONS
Send in your and [facility] will:  [address] MD  [address], [place]  Ph: [phone]  Fax: [phone]    WELL ADULT LIFESTYLE INSTRUCTIONS:    Jose Roberto Wilson a day in the next week to spend an hour reviewing the information below then:     1) determine your health goals for the year   2) determine what changes you need to achieve those goals   3) design your daily routine, shopping habits etc to implement those changes        Default Right Action (no choices)       Make it EASY to do the RIGHT THINGS. 4) I invite you to send me your plans via [employer] so I can continue to help you       with them    Examine your lifestyle with an emphasis on BARRIERS to bad and good habits and how you can design your life to make better choices. If you want to feel better these are the FUNDAMENTAL PILLARS of Wellness:    1)  You can choose to Get 150 min/week of moderate exercise (can talk but can't        sing) or 75 min/week of vigorous exercise (can't talk). This will enhance your sense of well being (Exercise is as good as medicine for   depression.)    2)  You can choose to Get 7-9 hours of sleep per night    Detoxifies your brain, reduces risk of dementia    3)  You can choose to Strength Train 2 x a week on non-consecutive days   This will improve function and reduce risk of injury. Body weight type exercises   such as Yoga and Pilates are excellent choices. 4)  You can choose Good Nutrition. Only eat your goal weight (in lbs) x 10        calories/day and get 5 servings of Vegetables/day   Plant based diets reduce risk of heart attack/stroke and will help you feel full on   less food. Avoid highly processed foods and processed carbohydrates. 5)  You can choose Moderate alcohol intake < 1-2 drinks/day   Alcohol will disrupt your sleep and add calories to your day. 6)  You can choose to Develop a Charismatic/Supportive relationship. 75 colon cancer, s/p kevin with colostomy, DM2, now with L foot and ankle swelling and pain since yesterday, atraumatic.  No fever, rash, sob, chest pain.  + scar at junction of ankle/foot where 'old ulcer healed' at same location.  Edema to foot and ankle.  No skin redness.  + warmth.  Unable to palpate pulses but Vacular consulted and obtained doppler signals.  Nl cap refill.  Plan labs, U/S and reassess. This will strengthen your resilience for the ups and downs. 7)  You can choose to Practice Mindfulness. An hour a day of prayer/meditation/gratitude will change your life! If you are trying to lose weight, here are some recommendations for weight loss:  Not every weight loss program is appropriate for everybody. ..  good online sources include Noom (more social with daily check ins), Lifesum (similar but less social) and Naturally slim, as well as Brandneu ($1500)    The GI Diet or \"Primal diet\", Intermittent fasting can also be effective choices. If you have diabetes treated with insulin be sure to ask for specific guidance around meals. Take your desired weight in pounds and multiply by 10 and that is your average daily calorie allowance. For example if you wish to weigh 170 lb x 10 = 1700 sandie/day (this is how to gradually lose the weight and maintain your desired weight). Avoid soda/coke and all \"wet carbs\" => Drink ice water instead    Drink a large glass of ice water before meals and EAT SLOWLY (talk while you eat)! Rethink your hunger => it means your losing weight.     Minimize highly processed carbohydrates as they stimulate your appetite:  Specifically cut back on Bread, Rice, Pasta and Potatoes    Avoid eating calories after 6 pm

## 2022-07-10 ENCOUNTER — PATIENT MESSAGE (OUTPATIENT)
Dept: PRIMARY CARE CLINIC | Age: 47
End: 2022-07-10

## 2022-07-11 NOTE — TELEPHONE ENCOUNTER
From: Selina Blue  To: Dr. Rosana Slaughter: 7/10/2022 4:03 PM EDT  Subject:  Biometrics Results    Just sending to attach to my file. Had bloodwork done for an insurance program through work.

## 2023-03-25 ENCOUNTER — PATIENT MESSAGE (OUTPATIENT)
Dept: PRIMARY CARE CLINIC | Age: 48
End: 2023-03-25

## 2023-04-07 ENCOUNTER — OFFICE VISIT (OUTPATIENT)
Dept: PRIMARY CARE CLINIC | Age: 48
End: 2023-04-07
Payer: COMMERCIAL

## 2023-04-07 VITALS
HEIGHT: 63 IN | BODY MASS INDEX: 34.41 KG/M2 | HEART RATE: 73 BPM | SYSTOLIC BLOOD PRESSURE: 116 MMHG | DIASTOLIC BLOOD PRESSURE: 72 MMHG | WEIGHT: 194.2 LBS | TEMPERATURE: 97.3 F

## 2023-04-07 DIAGNOSIS — Z00.00 ENCOUNTER FOR WELL ADULT EXAM WITHOUT ABNORMAL FINDINGS: Primary | ICD-10-CM

## 2023-04-07 PROCEDURE — 99396 PREV VISIT EST AGE 40-64: CPT | Performed by: FAMILY MEDICINE

## 2023-04-07 SDOH — ECONOMIC STABILITY: FOOD INSECURITY: WITHIN THE PAST 12 MONTHS, THE FOOD YOU BOUGHT JUST DIDN'T LAST AND YOU DIDN'T HAVE MONEY TO GET MORE.: NEVER TRUE

## 2023-04-07 SDOH — ECONOMIC STABILITY: FOOD INSECURITY: WITHIN THE PAST 12 MONTHS, YOU WORRIED THAT YOUR FOOD WOULD RUN OUT BEFORE YOU GOT MONEY TO BUY MORE.: NEVER TRUE

## 2023-04-07 SDOH — ECONOMIC STABILITY: HOUSING INSECURITY
IN THE LAST 12 MONTHS, WAS THERE A TIME WHEN YOU DID NOT HAVE A STEADY PLACE TO SLEEP OR SLEPT IN A SHELTER (INCLUDING NOW)?: NO

## 2023-04-07 SDOH — ECONOMIC STABILITY: INCOME INSECURITY: HOW HARD IS IT FOR YOU TO PAY FOR THE VERY BASICS LIKE FOOD, HOUSING, MEDICAL CARE, AND HEATING?: NOT HARD AT ALL

## 2023-04-07 ASSESSMENT — ENCOUNTER SYMPTOMS
ABDOMINAL PAIN: 0
NAUSEA: 0
SHORTNESS OF BREATH: 0
RHINORRHEA: 0
COUGH: 0
CONSTIPATION: 0
COLOR CHANGE: 0
EYE PAIN: 0
DIARRHEA: 0
VOMITING: 0
SORE THROAT: 0

## 2023-04-07 ASSESSMENT — PATIENT HEALTH QUESTIONNAIRE - PHQ9
SUM OF ALL RESPONSES TO PHQ QUESTIONS 1-9: 0
SUM OF ALL RESPONSES TO PHQ9 QUESTIONS 1 & 2: 0
SUM OF ALL RESPONSES TO PHQ QUESTIONS 1-9: 0
SUM OF ALL RESPONSES TO PHQ QUESTIONS 1-9: 0
2. FEELING DOWN, DEPRESSED OR HOPELESS: 0
1. LITTLE INTEREST OR PLEASURE IN DOING THINGS: 0
SUM OF ALL RESPONSES TO PHQ QUESTIONS 1-9: 0

## 2023-04-07 NOTE — PROGRESS NOTES
mcg/0.3 mL 10/03/2022    COVID-19, PFIZER GRAY top, DO NOT Dilute, (age 15 y+), IM, 30 mcg/0.3 mL 05/02/2022    COVID-19, PFIZER PURPLE top, DILUTE for use, (age 15 y+), 30mcg/0.3mL 03/27/2021, 04/16/2021, 12/17/2021    Influenza 11/11/2011    Influenza Vaccine, unspecified formulation 09/22/2016    Influenza Virus Vaccine 10/20/2008, 10/05/2009, 11/11/2011, 11/04/2014, 10/05/2015, 10/03/2022    Influenza, FLUARIX, FLULAVAL, Ruthell How (age 10 mo+) AND AFLURIA, (age 1 y+), PF, 0.5mL 11/06/2018    PPD Test 09/03/2013, 09/15/2014, 09/22/2014, 08/12/2015, 03/13/2017    Pneumococcal, PCV-13, PREVNAR 15, (age 6w+), IM, 0.5mL 11/04/2014    Pneumococcal, PPSV23, PNEUMOVAX 21, (age 2y+), SC/IM, 0.5mL 10/20/2008, 10/05/2009    TDaP, ADACEL (age 10y-63y), BOOSTRIX (age 10y+), IM, 0.5mL 10/21/2008        Health Maintenance   Topic Date Due    HIV screen  Never done    Hepatitis C screen  Never done    DTaP/Tdap/Td vaccine (2 - Td or Tdap) 10/21/2018    Cervical cancer screen  09/19/2021    Depression Screen  04/07/2023    Colorectal Cancer Screen  12/12/2026    Lipids  04/02/2027    Flu vaccine  Completed    COVID-19 Vaccine  Completed    Hepatitis A vaccine  Aged Out    Hib vaccine  Aged Out    Meningococcal (ACWY) vaccine  Aged Out    Pneumococcal 0-64 years Vaccine  Aged Out    A1C test (Diabetic or Prediabetic)  Discontinued     Recommendations for Preventive Services Due: see orders and patient instructions/AVS.    No follow-ups on file.

## 2023-04-07 NOTE — PATIENT INSTRUCTIONS
a large glass of ice water before meals and EAT SLOWLY (talk while you eat)! Rethink your hunger => it means your losing weight.     Minimize highly processed carbohydrates as they stimulate your appetite:  Specifically cut back on Bread, Rice, Pasta and Potatoes    Avoid eating calories after 6 pm

## 2023-04-07 NOTE — Clinical Note
Mathieu Gant would like to switch to you. She is doing great and I don't need her to f/u any time soon. Would you reach out to her if you would take her on?  Thanks, Isauro Damian

## 2023-07-23 NOTE — PROGRESS NOTES
[facility] Provider Note    Chief Complaint   Patient presents with   • Elbow     Follow up for right elbow issue, saw Roberta on 7/20       Patient was seen by me for right elbow bursitis there was some redness warmth it was a possibility of inflammation versus infection  Patient comes in today for his reevaluation with me  He has been completing the Ace wrap no fall no trauma  Patient has no fevers vomiting  He states he has no pain to his elbow he states he is feeling much better he has been taking his medications  He has no discharge redness or warmth  He has not been resting his arm on a hard object      Medications:  Current Outpatient Medications   Medication Sig   • sulfamethoxazole-trimethoprim (BACTRIM DS) 800-160 MG per tablet Take 1 tablet by mouth in the morning and 1 tablet in the evening. Do all this for 7 days.   • metoPROLOL succinate (TOPROL-XL) 50 MG 24 hr tablet every 24 hours.   • blood glucose test strip as directed   • aspirin (ECOTRIN) 81 MG EC tablet every 24 hours.   • atorvastatin (LIPITOR) 40 MG tablet every 24 hours.   • EPINEPHrine (EpiPen 2-Jeffrey) 0.3 MG/0.3ML auto-injector as directed Injection as directed for 1 days   • Insulin Glargine, 1 Unit Dial, (Toujeo SoloStar) 300 UNIT/ML pen-injector every 24 hours.   • canagliflozin (Invokana) 300 MG tablet Take 1 tablet by mouth daily.   • clopidogrel (PLAVIX) 75 MG tablet every 24 hours.   • furosemide (LASIX) 40 MG tablet Take 1 tablet by mouth 2 times daily.   • blood glucose (OneTouch Ultra) test strip TEST 3 TIMES A DAY for 33   • metoPROLOL tartrate (LOPRESSOR) 50 MG tablet every 12 hours.   • potassium CHLORIDE (KLOR-CON M) 20 MEQ conchita ER tablet Take 1 tablet by mouth in the morning and 1 tablet in the evening. Take with meals.   • sacubitril-valsartan (Entresto) 49-51 MG per tablet every 12 hours.   • OneTouch Delica Lancets 30G Misc 1 each 1 time.   • metformin (GLUCOPHAGE) 1000 MG tablet Take 1 tablet by  mouth in the morning and 1 tablet in the evening.     No current facility-administered medications for this visit.       Allergies:  ALLERGIES:   Allergen Reactions   • Bee Sting Other (See Comments)   • Penicillin G Other (See Comments)       Past Medical History  Past Medical History:   Diagnosis Date   • Diabetes mellitus (CMD)    • Essential (primary) hypertension        Past Surgical History:  Past Surgical History:   Procedure Laterality Date   • Pacemaker         Family History:  History reviewed. No pertinent family history.    Social History:  Social History     Tobacco Use   • Smoking status: Never   • Smokeless tobacco: Never   Substance Use Topics   • Alcohol use: Never   • Drug use: Never       Patient's medications, allergies, past medical, surgical, and social history  were reviewed and updated as appropriate.    Review of Systems   Constitutional: Negative for activity change, appetite change, chills and fever.   Respiratory: Negative for shortness of breath.    Cardiovascular: Negative for chest pain.   Musculoskeletal: Negative for arthralgias and joint swelling.   Skin: Negative for color change, pallor, rash and wound.   All other systems reviewed and are negative.      Objective     Visit Vitals  /74   Pulse 85   Temp 98.5 °F (36.9 °C) (Temporal)   Resp 18   SpO2 99%       Physical Exam  Vitals reviewed.   Constitutional:       General: He is awake.      Appearance: Normal appearance. He is well-developed, well-groomed and normal weight. He is not ill-appearing, toxic-appearing or diaphoretic.   HENT:      Head: Normocephalic and atraumatic.      Neck: Normal range of motion.   Eyes:      General: Lids are normal. Vision grossly intact.   Cardiovascular:      Rate and Rhythm: Normal rate.      Pulses: Normal pulses.   Pulmonary:      Effort: Pulmonary effort is normal. No accessory muscle usage or respiratory distress.      Breath sounds: Normal breath sounds and air entry. No stridor,  decreased air movement or transmitted upper airway sounds.   Musculoskeletal:      Right upper arm: Normal.      Right forearm: Normal.        Arms:    Skin:     General: Skin is warm.      Capillary Refill: Capillary refill takes less than 2 seconds.      Findings: No bruising, ecchymosis, erythema or rash.   Neurological:      Mental Status: He is alert and oriented to person, place, and time.   Psychiatric:         Attention and Perception: Attention normal.         Mood and Affect: Mood normal.         Speech: Speech normal.         Behavior: Behavior is cooperative.         Thought Content: Thought content normal.         Cognition and Memory: Cognition normal.         Labs:   No results found for this visit on 07/23/23.     Imaging:  No results found.       Assessment:   1. Olecranon bursitis of right elbow           Plan:    Patient nontoxic exam and results reviewed at this time the area is looking much better he will continue his antibiotics  He will continue rice he will follow with his primary when he gets back from [place]  He left in stable condition no obvious signs of infection at this time he is educated regarding bursitis    Instructions provided as documented in the AVS.    Lorna Blakely NP  1:24 PM       Out    Meningococcal (ACWY) vaccine  Aged Out    Pneumococcal 0-64 years Vaccine  Aged Out       RTC 3 months for follow-up on weight

## 2023-08-08 DIAGNOSIS — K50.90 CROHN'S DISEASE WITHOUT COMPLICATION, UNSPECIFIED GASTROINTESTINAL TRACT LOCATION (HCC): Primary | ICD-10-CM

## 2023-08-08 DIAGNOSIS — E53.8 VITAMIN B 12 DEFICIENCY: ICD-10-CM

## 2023-08-09 DIAGNOSIS — K50.90 CROHN'S DISEASE WITHOUT COMPLICATION, UNSPECIFIED GASTROINTESTINAL TRACT LOCATION (HCC): ICD-10-CM

## 2023-08-09 DIAGNOSIS — E53.8 VITAMIN B 12 DEFICIENCY: ICD-10-CM

## 2023-08-09 LAB
25(OH)D3 SERPL-MCNC: 37 NG/ML
FOLATE SERPL-MCNC: 19.8 NG/ML (ref 4.78–24.2)

## 2023-08-11 LAB — METHYLMALONATE SERPL-SCNC: 0.1 UMOL/L (ref 0–0.4)

## 2023-08-12 LAB — VIT B2 SERPL-SCNC: 19 NMOL/L (ref 5–50)

## 2024-01-03 LAB
25(OH)D3 SERPL-MCNC: 36.2 NG/ML
BASOPHILS # BLD: 0 K/UL (ref 0–0.2)
BASOPHILS NFR BLD: 0.8 %
DEPRECATED RDW RBC AUTO: 14.7 % (ref 12.4–15.4)
EOSINOPHIL # BLD: 0.1 K/UL (ref 0–0.6)
EOSINOPHIL NFR BLD: 1.3 %
FERRITIN SERPL IA-MCNC: 50.6 NG/ML (ref 15–150)
FOLATE SERPL-MCNC: 19.76 NG/ML (ref 4.78–24.2)
HCT VFR BLD AUTO: 41.6 % (ref 36–48)
HGB BLD-MCNC: 14 G/DL (ref 12–16)
LYMPHOCYTES # BLD: 2.6 K/UL (ref 1–5.1)
LYMPHOCYTES NFR BLD: 48.3 %
MCH RBC QN AUTO: 30.3 PG (ref 26–34)
MCHC RBC AUTO-ENTMCNC: 33.6 G/DL (ref 31–36)
MCV RBC AUTO: 90 FL (ref 80–100)
MONOCYTES # BLD: 0.3 K/UL (ref 0–1.3)
MONOCYTES NFR BLD: 6.3 %
NEUTROPHILS # BLD: 2.3 K/UL (ref 1.7–7.7)
NEUTROPHILS NFR BLD: 43.3 %
PLATELET # BLD AUTO: 281 K/UL (ref 135–450)
PMV BLD AUTO: 9.3 FL (ref 5–10.5)
RBC # BLD AUTO: 4.62 M/UL (ref 4–5.2)
VIT B12 SERPL-MCNC: 682 PG/ML (ref 211–911)
WBC # BLD AUTO: 5.4 K/UL (ref 4–11)

## 2024-01-04 LAB
ALBUMIN SERPL-MCNC: 4.6 G/DL (ref 3.4–5)
ALBUMIN/GLOB SERPL: 1.8 {RATIO} (ref 1.1–2.2)
ALP SERPL-CCNC: 64 U/L (ref 40–129)
ALT SERPL-CCNC: 18 U/L (ref 10–40)
ANION GAP SERPL CALCULATED.3IONS-SCNC: 10 MMOL/L (ref 3–16)
AST SERPL-CCNC: 27 U/L (ref 15–37)
BILIRUB SERPL-MCNC: 1.2 MG/DL (ref 0–1)
BUN SERPL-MCNC: 20 MG/DL (ref 7–20)
CALCIUM SERPL-MCNC: 9 MG/DL (ref 8.3–10.6)
CHLORIDE SERPL-SCNC: 105 MMOL/L (ref 99–110)
CO2 SERPL-SCNC: 26 MMOL/L (ref 21–32)
CREAT SERPL-MCNC: 0.8 MG/DL (ref 0.6–1.1)
CRP SERPL-MCNC: <3 MG/L (ref 0–5.1)
GFR SERPLBLD CREATININE-BSD FMLA CKD-EPI: >60 ML/MIN/{1.73_M2}
GLUCOSE SERPL-MCNC: 82 MG/DL (ref 70–99)
IRON SATN MFR SERPL: 31 % (ref 15–50)
IRON SERPL-MCNC: 109 UG/DL (ref 37–145)
POTASSIUM SERPL-SCNC: 4.7 MMOL/L (ref 3.5–5.1)
PROT SERPL-MCNC: 7.2 G/DL (ref 6.4–8.2)
SODIUM SERPL-SCNC: 141 MMOL/L (ref 136–145)
TIBC SERPL-MCNC: 350 UG/DL (ref 260–445)

## 2024-01-05 LAB
GAMMA INTERFERON BACKGROUND BLD IA-ACNC: 0.01 IU/ML
MITOGEN IGNF BCKGRD COR BLD-ACNC: >10 IU/ML
QUANTI TB GOLD PLUS: NEGATIVE
QUANTI TB1 MINUS NIL: 0.01 IU/ML (ref 0–0.34)
QUANTI TB2 MINUS NIL: 0.01 IU/ML (ref 0–0.34)

## 2024-01-10 ENCOUNTER — TELEPHONE (OUTPATIENT)
Dept: PRIMARY CARE CLINIC | Age: 49
End: 2024-01-10

## 2024-01-10 ENCOUNTER — OFFICE VISIT (OUTPATIENT)
Dept: PRIMARY CARE CLINIC | Age: 49
End: 2024-01-10
Payer: COMMERCIAL

## 2024-01-10 VITALS
BODY MASS INDEX: 34.38 KG/M2 | HEIGHT: 63 IN | TEMPERATURE: 97.2 F | OXYGEN SATURATION: 96 % | HEART RATE: 67 BPM | SYSTOLIC BLOOD PRESSURE: 127 MMHG | DIASTOLIC BLOOD PRESSURE: 82 MMHG | WEIGHT: 194 LBS

## 2024-01-10 DIAGNOSIS — Z00.00 ANNUAL PHYSICAL EXAM: ICD-10-CM

## 2024-01-10 DIAGNOSIS — E66.01 MORBID OBESITY DUE TO EXCESS CALORIES (HCC): Primary | ICD-10-CM

## 2024-01-10 PROCEDURE — 99214 OFFICE O/P EST MOD 30 MIN: CPT | Performed by: STUDENT IN AN ORGANIZED HEALTH CARE EDUCATION/TRAINING PROGRAM

## 2024-01-10 RX ORDER — TRETINOIN 1 MG/G
CREAM TOPICAL
COMMUNITY
Start: 2023-12-04

## 2024-01-10 ASSESSMENT — PATIENT HEALTH QUESTIONNAIRE - PHQ9
SUM OF ALL RESPONSES TO PHQ QUESTIONS 1-9: 0
1. LITTLE INTEREST OR PLEASURE IN DOING THINGS: 0
SUM OF ALL RESPONSES TO PHQ QUESTIONS 1-9: 0
SUM OF ALL RESPONSES TO PHQ9 QUESTIONS 1 & 2: 0
2. FEELING DOWN, DEPRESSED OR HOPELESS: 0
SUM OF ALL RESPONSES TO PHQ QUESTIONS 1-9: 0
SUM OF ALL RESPONSES TO PHQ QUESTIONS 1-9: 0

## 2024-01-10 ASSESSMENT — ENCOUNTER SYMPTOMS
NAUSEA: 0
WHEEZING: 0
SHORTNESS OF BREATH: 0

## 2024-01-10 NOTE — PROGRESS NOTES
[facility]  1/10/2024    Asya Vásquez (:  1975) is a 48 y.o. female, here for evaluation of the following medical concerns:    Chief Complaint   Patient presents with    [facility] Care    Weight Management        ASSESSMENT/ PLAN  1. Morbid obesity due to excess calories (HCC)  Complicates all aspects of patient's care.  CMP, TSH reviewed in Care Everywhere and normal.  Initiate GLP-1 today and follow-up in 3 months for recheck  - Tirzepatide-Weight Management 2.5 MG/0.5ML SOAJ; Inject 2.5 mg into the skin once a week  Dispense: 2 mL; Refill: 2    2. Annual physical exam  Labs ordered for biometric screening  - Lipid Panel; Future    >30 minutes spent on chart review, care coordination and patient counseling regarding disease state, lifestyle modifications and/or health maintenance screening.     Return in about 3 months (around 4/10/2024) for zepbound, weight management .    HPI  Patient presents today to complete biometric screening and discuss weight loss medication.    She is due for cholesterol screening panel for her biometric screening.  She has had the remainder of the labs completed by GI, who manage her Crohn's disease.    She notes frustration with weight loss plateau over the past year.  She originally lost 50 pounds when she started exercising at [employer], but has been stuck at 194 pounds despite exercise and calorie restriction.    She completes Pap smears through [facility], and is up-to-date.    ROS  Review of Systems   Constitutional:  Negative for activity change and unexpected weight change.   HENT:  Negative for tinnitus.    Eyes:  Negative for visual disturbance.   Respiratory:  Negative for shortness of breath and wheezing.    Cardiovascular:  Negative for chest pain, palpitations and leg swelling.   Gastrointestinal:  Negative for nausea.   Genitourinary:  Negative for decreased urine volume.   Neurological:  Negative for syncope, light-headedness and headaches.

## 2024-01-11 LAB
CHOLEST SERPL-MCNC: 181 MG/DL (ref 0–199)
HDLC SERPL-MCNC: 65 MG/DL (ref 40–60)
LDLC SERPL CALC-MCNC: 103 MG/DL
TRIGL SERPL-MCNC: 66 MG/DL (ref 0–150)
VLDLC SERPL CALC-MCNC: 13 MG/DL

## 2024-01-15 ENCOUNTER — TELEMEDICINE (OUTPATIENT)
Dept: PRIMARY CARE CLINIC | Age: 49
End: 2024-01-15
Payer: COMMERCIAL

## 2024-01-15 DIAGNOSIS — E66.01 MORBID OBESITY DUE TO EXCESS CALORIES (HCC): ICD-10-CM

## 2024-01-15 DIAGNOSIS — J06.9 VIRAL URI: Primary | ICD-10-CM

## 2024-01-15 PROCEDURE — 99213 OFFICE O/P EST LOW 20 MIN: CPT | Performed by: STUDENT IN AN ORGANIZED HEALTH CARE EDUCATION/TRAINING PROGRAM

## 2024-01-15 ASSESSMENT — ENCOUNTER SYMPTOMS
SINUS PRESSURE: 0
SORE THROAT: 0
SHORTNESS OF BREATH: 0
WHEEZING: 0
VOMITING: 0
EYE PAIN: 0
NAUSEA: 0
RHINORRHEA: 1
COUGH: 1
DIARRHEA: 0

## 2024-01-15 NOTE — PROGRESS NOTES
intravenously See Admin Instructions.         No current facility-administered medications on file prior to visit.      Past Medical History:   Diagnosis Date    Allergic rhinitis     COVID-19 12/28/2020    Crohn disease (HCC)     Dr. Hathaway    Fatty liver     with elevated LFT - [facility]    Morbid obesity due to excess calories (HCC) 10/21/2016    Rosacea 09/20/2021    Treating with zithromax since July 2021    Trigger thumb of right hand 11/12/2014     Patient Active Problem List   Diagnosis    Crohn disease (HCC)    Fatty liver    Morbid obesity due to excess calories (HCC)    Rosacea       PE  There were no vitals filed for this visit.  Estimated body mass index is 33.93 kg/m² as calculated from the following:    Height as of 1/10/24: 1.61 m (5' 3.4\").    Weight as of 1/10/24: 88 kg (194 lb).    [INSTRUCTIONS:  \"[x]\" Indicates a positive item  \"[]\" Indicates a negative item  -- DELETE ALL ITEMS NOT EXAMINED]    Constitutional: [x] Appears well-developed and well-nourished [x] No apparent distress      [] Abnormal -     Mental status: [x] Alert and awake  [x] Oriented to person/place/time [x] Able to follow commands    [] Abnormal -     Eyes:   EOM    [x]  Normal    [] Abnormal -   Sclera  [x]  Normal    [] Abnormal -          Discharge [x]  None visible   [] Abnormal -     HENT: [x] Normocephalic, atraumatic  [] Abnormal -   [x] Mouth/Throat: Mucous membranes are moist    External Ears [x] Normal  [] Abnormal -    Neck: [x] No visualized mass [] Abnormal -     Pulmonary/Chest: [x] Respiratory effort normal   [x] No visualized signs of difficulty breathing or respiratory distress        [] Abnormal -      Musculoskeletal:   [x] Normal gait with no signs of ataxia         [x] Normal range of motion of neck        [] Abnormal -     Neurological:        [x] No Facial Asymmetry (Cranial nerve 7 motor function) (limited exam due to video visit)          [x] No gaze palsy        [] Abnormal -          Skin:

## 2024-01-17 ENCOUNTER — TELEPHONE (OUTPATIENT)
Dept: PRIMARY CARE CLINIC | Age: 49
End: 2024-01-17

## 2024-01-17 DIAGNOSIS — J32.9 BACTERIAL SINUSITIS: Primary | ICD-10-CM

## 2024-01-17 DIAGNOSIS — B96.89 BACTERIAL SINUSITIS: Primary | ICD-10-CM

## 2024-01-17 RX ORDER — DOXYCYCLINE HYCLATE 100 MG
100 TABLET ORAL 2 TIMES DAILY
Qty: 14 TABLET | Refills: 0 | Status: SHIPPED | OUTPATIENT
Start: 2024-01-17 | End: 2024-01-24

## 2024-01-17 NOTE — TELEPHONE ENCOUNTER
Tirzepatide-Weight Management 2.5 MG/0.5ML SOAJ [[id number]]    Order Details  Dose: 2.5 mg Route: SubCUTAneous Frequency: WEEKLY   Dispense Quantity: 6 mL Refills: 0          Sig: Inject 2.5 mg into the skin once a week         Start Date: 01/15/24 End Date: --   Written Date: 01/15/24 Expiration Date: 01/14/25       Associated Diagnoses: Morbid obesity due to excess calories (HCC) [E66.01]   Original Order: Tirzepatide-Weight Management 2.5 MG/0.5ML SOAJ [[id number]]   Providers    Authorizing Provider: Tayla Stanford DO NPI: [id number]   Ordering User: Tayla Stanford DO          Pharmacy    [facility] - [address] -  [phone] - F [phone]  [address] [place] [address]  Phone: [phone]  Fax: [phone]     Order Class:    Order Class   Normal [1]     Destination    This Order   EPA REQUEST [[id number]]   E-PRESCRIBING INTERFACE [[id number]]     Action Summary    Action User: --            Warnings Override History    No Interaction Warnings Shown      Tirzepatide-Weight Management 2.5 MG/0.5ML SOAJ  Date: 1/15/2024 Department: [facility] Ordering/Authorizing: Tayla Stanford DO     Outpatient Medication Detail     Disp Refills Start End    Tirzepatide-Weight Management 2.5 MG/0.5ML SOAJ 6 mL 0 1/15/2024 --    Sig - Route: Inject 2.5 mg into the skin once a week - SubCUTAneous    Sent to pharmacy as: Tirzepatide-Weight Management 2.5 MG/0.5ML Subcutaneous Solution Auto-injector    E-Prescribing Status: Receipt confirmed by pharmacy (1/15/2024  7:23 AM EST)    Prior authorization: Payer Waiting for Response

## 2024-01-19 NOTE — TELEPHONE ENCOUNTER
PA for Zepbound 2.5MG/0.5ML pen-injectors was submitted and approved in 01/10/24 telephone encounter.

## 2024-07-05 ENCOUNTER — PATIENT MESSAGE (OUTPATIENT)
Dept: PRIMARY CARE CLINIC | Age: 49
End: 2024-07-05

## 2024-07-08 RX ORDER — METHYLPREDNISOLONE 4 MG/1
TABLET ORAL
Qty: 1 KIT | Refills: 0 | Status: SHIPPED | OUTPATIENT
Start: 2024-07-08 | End: 2024-07-14

## 2024-07-08 NOTE — TELEPHONE ENCOUNTER
From: Asya Vásquez  To: Dr. Tayla Stanford  Sent: 7/5/2024 1:16 PM EDT  Subject: Prednisone    Good afternoon, Can you please prescribe a round of prednisone for me? I have a flare up of cancer sores and steroids always help. My pharmacy is [facility] at [address] [phone].     Thanks,  Asya  [phone]

## 2024-07-22 ENCOUNTER — OFFICE VISIT (OUTPATIENT)
Dept: PRIMARY CARE CLINIC | Age: 49
End: 2024-07-22
Payer: COMMERCIAL

## 2024-07-22 VITALS
BODY MASS INDEX: 33.81 KG/M2 | DIASTOLIC BLOOD PRESSURE: 60 MMHG | TEMPERATURE: 97.4 F | HEIGHT: 63 IN | WEIGHT: 190.8 LBS | SYSTOLIC BLOOD PRESSURE: 105 MMHG | HEART RATE: 74 BPM

## 2024-07-22 DIAGNOSIS — R59.0 LYMPHADENOPATHY, CERVICAL: Primary | ICD-10-CM

## 2024-07-22 PROCEDURE — 99213 OFFICE O/P EST LOW 20 MIN: CPT | Performed by: NURSE PRACTITIONER

## 2024-07-22 NOTE — ASSESSMENT & PLAN NOTE
Ultrasound ordered   We will talk when results are back for possible next steps   Pt is agreeable with this plan

## 2024-07-23 ENCOUNTER — HOSPITAL ENCOUNTER (OUTPATIENT)
Dept: ULTRASOUND IMAGING | Age: 49
Discharge: HOME | End: 2024-07-23
Payer: COMMERCIAL

## 2024-07-23 DIAGNOSIS — R59.0 LYMPHADENOPATHY, CERVICAL: ICD-10-CM

## 2024-07-23 PROCEDURE — 76536 US EXAM OF HEAD AND NECK: CPT

## 2024-07-24 NOTE — PROGRESS NOTES
[facility] PRE-OPERATIVE INSTRUCTIONS    Day of Procedure:   8/2/24             Arrival time:  10:30              Surgery time: 12NOON    Take the following medications with a sip of water:  Follow your MD/Surgeons pre-procedure instructions regarding your medications     Do not eat or drink anything after 12:00 midnight prior to your surgery.  This includes water chewing gum, mints and ice chips.   You may brush your teeth and gargle the morning of your surgery, but do not swallow the water     Please see your family doctor/pediatrician for a history and physical and/or concerning medications.   Bring any test results/reports from your physicians office.   If you are under the care of a heart doctor or specialist doctor, please be aware that you may be asked to them for clearance    You may be asked to stop blood thinners such as Coumadin, Plavix, Fragmin, Lovenox, etc., or any anti-inflammatories such as:  Aspirin, Ibuprofen, Advil, Naproxen prior to your surgery.    We also ask that you stop any OTC medications such as fish oil, vitamin E, glucosamine, garlic, Multivitamins, COQ 10, etc. MAY TAKE TYLENOL    We ask that you do not smoke 24 hours prior to surgery  We ask that you do not  drink any alcoholic beverages 24 hours prior to surgery     You must make arrangements for a responsible adult to take you home after your surgery.    For your safety you will not be allowed to leave alone or drive yourself home.  Your surgery will be cancelled if you do not have a ride home.     Also for your safety, you must have someone stay with you the first 24 hours after your surgery.     A parent or legal guardian must accompany a child scheduled for surgery and plan to stay at the hospital until the child is discharged.    Please do not bring other children with you.    For your comfort, please wear simple loose fitting clothing to the hospital.  Please do not bring valuables.    Do not wear any make-up or

## 2024-07-24 NOTE — PROGRESS NOTES
[facility] Pre-Admission Testing Electronic Communication Worksheet for OR/ENDO Procedures        Patient: Asya Vásquez    DOS: 8/2/24    Transportation Confirmed: [x] YES    []  NO  RADHA OR DAUGHTER JASIEL    History and Physical:  [] YES    []  NO  [x] N/A  If yes, please list doctor or Urgent Care and date of H&P: DR. KIRAN    Additional Clearance(Cardiac, Pulmonary, etc):  [] YES    [x]  NO    Pre-Admission Testing Visit:  [] YES    [x]  NO If no, do labs/testing need to be done DOS?  [] YES    []  NO    Medication Reconciliation Complete:  [x] YES    []  NO        Additional Notes:    SCREENING            Interview Complete: [x] YES    []  NO          Flory Cherry, RN  4:21 PM

## 2024-08-01 ENCOUNTER — ANESTHESIA EVENT (OUTPATIENT)
Dept: ENDOSCOPY | Age: 49
End: 2024-08-01
Payer: COMMERCIAL

## 2024-08-02 ENCOUNTER — ANESTHESIA (OUTPATIENT)
Dept: ENDOSCOPY | Age: 49
End: 2024-08-02
Payer: COMMERCIAL

## 2024-08-02 ENCOUNTER — HOSPITAL ENCOUNTER (OUTPATIENT)
Age: 49
Setting detail: OUTPATIENT SURGERY
Discharge: HOME | End: 2024-08-02
Attending: INTERNAL MEDICINE | Admitting: INTERNAL MEDICINE
Payer: COMMERCIAL

## 2024-08-02 VITALS
HEART RATE: 68 BPM | SYSTOLIC BLOOD PRESSURE: 128 MMHG | OXYGEN SATURATION: 98 % | WEIGHT: 185.85 LBS | BODY MASS INDEX: 31.73 KG/M2 | TEMPERATURE: 97 F | HEIGHT: 64 IN | DIASTOLIC BLOOD PRESSURE: 77 MMHG | RESPIRATION RATE: 18 BRPM

## 2024-08-02 DIAGNOSIS — Z86.010 PERSONAL HISTORY OF COLONIC POLYPS: ICD-10-CM

## 2024-08-02 PROCEDURE — 2580000003 HC RX 258: Performed by: NURSE ANESTHETIST, CERTIFIED REGISTERED

## 2024-08-02 PROCEDURE — 7100000010 HC PHASE II RECOVERY - FIRST 15 MIN: Performed by: INTERNAL MEDICINE

## 2024-08-02 PROCEDURE — 7100000000 HC PACU RECOVERY - FIRST 15 MIN: Performed by: INTERNAL MEDICINE

## 2024-08-02 PROCEDURE — 3609010300 HC COLONOSCOPY W/BIOPSY SINGLE/MULTIPLE: Performed by: INTERNAL MEDICINE

## 2024-08-02 PROCEDURE — 2709999900 HC NON-CHARGEABLE SUPPLY: Performed by: INTERNAL MEDICINE

## 2024-08-02 PROCEDURE — 3700000001 HC ADD 15 MINUTES (ANESTHESIA): Performed by: INTERNAL MEDICINE

## 2024-08-02 PROCEDURE — 7100000011 HC PHASE II RECOVERY - ADDTL 15 MIN: Performed by: INTERNAL MEDICINE

## 2024-08-02 PROCEDURE — 3700000000 HC ANESTHESIA ATTENDED CARE: Performed by: INTERNAL MEDICINE

## 2024-08-02 PROCEDURE — 88305 TISSUE EXAM BY PATHOLOGIST: CPT

## 2024-08-02 PROCEDURE — 2500000003 HC RX 250 WO HCPCS: Performed by: NURSE ANESTHETIST, CERTIFIED REGISTERED

## 2024-08-02 PROCEDURE — 6360000002 HC RX W HCPCS: Performed by: NURSE ANESTHETIST, CERTIFIED REGISTERED

## 2024-08-02 RX ORDER — SODIUM CHLORIDE 0.9 % (FLUSH) 0.9 %
5-40 SYRINGE (ML) INJECTION PRN
Status: DISCONTINUED | OUTPATIENT
Start: 2024-08-02 | End: 2024-08-02 | Stop reason: HOSPADM

## 2024-08-02 RX ORDER — SODIUM CHLORIDE 9 MG/ML
INJECTION, SOLUTION INTRAVENOUS PRN
Status: DISCONTINUED | OUTPATIENT
Start: 2024-08-02 | End: 2024-08-02 | Stop reason: HOSPADM

## 2024-08-02 RX ORDER — SODIUM CHLORIDE 0.9 % (FLUSH) 0.9 %
5-40 SYRINGE (ML) INJECTION EVERY 12 HOURS SCHEDULED
Status: DISCONTINUED | OUTPATIENT
Start: 2024-08-02 | End: 2024-08-02 | Stop reason: HOSPADM

## 2024-08-02 RX ORDER — GLYCOPYRROLATE 0.2 MG/ML
INJECTION INTRAMUSCULAR; INTRAVENOUS PRN
Status: DISCONTINUED | OUTPATIENT
Start: 2024-08-02 | End: 2024-08-02 | Stop reason: SDUPTHER

## 2024-08-02 RX ORDER — LIDOCAINE HYDROCHLORIDE 20 MG/ML
INJECTION, SOLUTION EPIDURAL; INFILTRATION; INTRACAUDAL; PERINEURAL PRN
Status: DISCONTINUED | OUTPATIENT
Start: 2024-08-02 | End: 2024-08-02 | Stop reason: SDUPTHER

## 2024-08-02 RX ORDER — NALOXONE HYDROCHLORIDE 0.4 MG/ML
INJECTION, SOLUTION INTRAMUSCULAR; INTRAVENOUS; SUBCUTANEOUS PRN
Status: DISCONTINUED | OUTPATIENT
Start: 2024-08-02 | End: 2024-08-02 | Stop reason: HOSPADM

## 2024-08-02 RX ORDER — SODIUM CHLORIDE 9 MG/ML
INJECTION, SOLUTION INTRAVENOUS CONTINUOUS PRN
Status: DISCONTINUED | OUTPATIENT
Start: 2024-08-02 | End: 2024-08-02 | Stop reason: SDUPTHER

## 2024-08-02 RX ORDER — PROPOFOL 10 MG/ML
INJECTION, EMULSION INTRAVENOUS PRN
Status: DISCONTINUED | OUTPATIENT
Start: 2024-08-02 | End: 2024-08-02 | Stop reason: SDUPTHER

## 2024-08-02 RX ADMIN — PROPOFOL 50 MG: 10 INJECTION, EMULSION INTRAVENOUS at 12:50

## 2024-08-02 RX ADMIN — SODIUM CHLORIDE: 9 INJECTION, SOLUTION INTRAVENOUS at 12:51

## 2024-08-02 RX ADMIN — PROPOFOL 50 MG: 10 INJECTION, EMULSION INTRAVENOUS at 12:41

## 2024-08-02 RX ADMIN — SODIUM CHLORIDE: 9 INJECTION, SOLUTION INTRAVENOUS at 12:32

## 2024-08-02 RX ADMIN — LIDOCAINE HYDROCHLORIDE 60 MG: 20 INJECTION, SOLUTION EPIDURAL; INFILTRATION; INTRACAUDAL; PERINEURAL at 12:36

## 2024-08-02 RX ADMIN — PROPOFOL 50 MG: 10 INJECTION, EMULSION INTRAVENOUS at 12:38

## 2024-08-02 RX ADMIN — GLYCOPYRROLATE 0.2 MG: 0.2 INJECTION INTRAMUSCULAR; INTRAVENOUS at 12:36

## 2024-08-02 RX ADMIN — PROPOFOL 100 MG: 10 INJECTION, EMULSION INTRAVENOUS at 12:36

## 2024-08-02 RX ADMIN — PROPOFOL 50 MG: 10 INJECTION, EMULSION INTRAVENOUS at 12:43

## 2024-08-02 RX ADMIN — PROPOFOL 100 MG: 10 INJECTION, EMULSION INTRAVENOUS at 12:40

## 2024-08-02 RX ADMIN — PROPOFOL 50 MG: 10 INJECTION, EMULSION INTRAVENOUS at 12:45

## 2024-08-02 ASSESSMENT — PAIN - FUNCTIONAL ASSESSMENT: PAIN_FUNCTIONAL_ASSESSMENT: 0-10

## 2024-08-02 ASSESSMENT — PAIN SCALES - GENERAL: PAINLEVEL_OUTOF10: 0

## 2024-08-02 ASSESSMENT — LIFESTYLE VARIABLES: SMOKING_STATUS: 0

## 2024-08-02 NOTE — OP NOTE
Colonoscopy Note    Patient:   Asya Vásquez    :    1975    Facility:   [facility] [Outpatient]  Referring/PCP: Tayla Stanford DO  Procedure:   Colonoscopy   Date:     2024  Endoscopist:  Emile Hathaway MD, MD    Preoperative Diagnosis:  history of crohn's disease, surveillance colonoscopy.    Postoperative Diagnosis:  1.  Normal mucosa in the ileum  2.  Pseudopolyps in the descending and transverse colon consistent with a history of Crohn's disease.  3.  Mucosal scarring in the descending and sigmoid colon, consistent with quiescent Crohn's disease.  4.  No evidence of active Crohn's disease.  5.  Small internal hemorrhoids  6.  Multiple biopsies were obtained , at 4 quadrants/10 cm interval , and sent into 2 separate jars labeled as right colon and left colon.    Anesthesia: MAC    Estimated blood loss: Minimal    Complications:  None    Specimen: Biopsies of right colon, biopsies of left colon.    Instrument:     Description of Procedure:  Informed consent was obtained from the patient after explanation of the procedure including indications, description of the procedure,  benefits and possible risks and complications of the procedure, and alternatives. Questions were answered.  The patient's history was reviewed and a directed physical examination was performed prior to the procedure.    Patient was monitored throughout the procedure with pulse oximetry and periodic assessment of vital signs. Patient was sedated as noted above. With the patient initially in the left lateral decubitus position, a digital rectal examination was performed and revealed negative without mass, lesions or tenderness.  The Olympus video colonoscope was placed in the patient's rectum and advanced without difficulty  to the terminal ileum.   The prep was excellent.  Examination of the mucosa was performed during both introduction and withdrawal of the colonoscope. Retroflexed view of the

## 2024-08-02 NOTE — H&P
Gastroenteroloy   Attending Pre-operative History and Physical      PROCEDURE:  colonoscopy    Indication:The patient is a 49 y.o. female presents for a colonoscopy.    Past Medical History:    Past Medical History:   Diagnosis Date    Allergic rhinitis     COVID-19 2020    Crohn disease (HCC)     Dr. Hathaway    Fatty liver     with elevated LFT - Rivas    Morbid obesity due to excess calories (HCC) 10/21/2016    Rosacea 2021    Treating with zithromax since 2021    Trigger thumb of right hand 2014      Past Surgical History:    Past Surgical History:   Procedure Laterality Date     SECTION      x2    ENDOMETRIAL ABLATION      Dr. Mendoza    TONSILLECTOMY      WISDOM TOOTH EXTRACTION        Medications Prior to Admission:   Prior to Admission medications    Medication Sig Start Date End Date Taking? Authorizing Provider   inFLIXimab (REMICADE) 100 MG injection Infuse 5 mg/kg intravenously Once every 6 weeks    Provider, MD Derek        Allergies:  Minocycline, Oxycodone-acetaminophen, Pcn [penicillins], and Codeine  History of allergic reaction to anesthesia:  No    Social History:   Social History     Socioeconomic History    Marital status:      Spouse name: Kartik    Number of children: 2    Years of education: Not on file    Highest education level: Not on file   Occupational History    Occupation:  with [employer]   Tobacco Use    Smoking status: Former     Current packs/day: 0.00     Average packs/day: 0.5 packs/day for 5.0 years (2.5 ttl pk-yrs)     Types: Cigarettes     Start date: 1994     Quit date: 1999     Years since quittin.7    Smokeless tobacco: Never   Vaping Use    Vaping Use: Never used   Substance and Sexual Activity    Alcohol use: Yes     Comment: occasional    Drug use: Never    Sexual activity: Yes     Partners: Male     Comment:  with 2 kids   Other Topics Concern    Not on file   Social History

## 2024-08-02 NOTE — PROGRESS NOTES
Patient arrives to PACU at this time. VSS on room air. Patient resting comfortably with eyes closed. No signs or symptoms of acute distress.

## 2024-08-02 NOTE — ANESTHESIA POSTPROCEDURE EVALUATION
Department of Anesthesiology  Postprocedure Note    Patient: Asya Vásquez  MRN: [id number]  YOB: 1975  Date of evaluation: 8/2/2024    Procedure Summary       Date: 08/02/24 Room / Location: [address]    Anesthesia Start: 1232 Anesthesia Stop: 1300    Procedure: COLONOSCOPY BIOPSY Diagnosis:       Personal history of colonic polyps      (Personal history of colonic polyps [Z86.010])    Surgeons: Emile Hathaway MD Responsible Provider: Pancho Gudino MD    Anesthesia Type: MAC ASA Status: 3            Anesthesia Type: MAC    Martinez Phase I: Martinez Score: 10    Martinez Phase II: Martinez Score: 10    Anesthesia Post Evaluation    Patient location during evaluation: PACU  Patient participation: complete - patient participated  Level of consciousness: awake and alert  Pain score: 0  Airway patency: patent  Nausea & Vomiting: no nausea and no vomiting  Cardiovascular status: blood pressure returned to baseline  Respiratory status: acceptable  Hydration status: euvolemic  Pain management: adequate    No notable events documented.

## 2024-08-02 NOTE — ANESTHESIA PRE PROCEDURE
GI/Hepatic/Renal:   (+) liver disease:, bowel prep, morbid obesity     (-) GERD      ROS comment: Crohns Disease.   Endo/Other:        (-) diabetes mellitus, hypothyroidism               Abdominal:             Vascular:     - DVT and PE.      Other Findings:             Anesthesia Plan      MAC     ASA 3       Induction: intravenous.      Anesthetic plan and risks discussed with patient.      Plan discussed with CRNA.                    This pre-anesthesia assessment may be used as a history and physical.    DOS STAFF ADDENDUM:    Pt seen and examined, chart reviewed (including anesthesia, drug and allergy history).  No interval changes to history and physical examination.  Anesthetic plan, risks, benefits, alternatives, and personnel involved discussed with patient.  Patient verbalized an understanding and agrees to proceed.      Pancho Gudino MD  August 2, 2024  11:16 AM

## 2024-09-28 ENCOUNTER — TELEPHONE (OUTPATIENT)
Dept: PRIMARY CARE CLINIC | Age: 49
End: 2024-09-28

## 2024-09-28 RX ORDER — METHYLPREDNISOLONE 4 MG
TABLET, DOSE PACK ORAL
Qty: 1 KIT | Refills: 0 | Status: SHIPPED | OUTPATIENT
Start: 2024-09-28

## 2024-09-30 ENCOUNTER — TELEPHONE (OUTPATIENT)
Dept: PRIMARY CARE CLINIC | Age: 49
End: 2024-09-30

## 2024-09-30 NOTE — TELEPHONE ENCOUNTER
Pt called in states she has a rash on her left and right leg and it's spreading pt said Dr. Lewis helped her on Friday because he was on call and wanted to know if he can send something it to help through the night she has an appointment to come in tomorrow.

## 2024-10-01 ENCOUNTER — OFFICE VISIT (OUTPATIENT)
Dept: PRIMARY CARE CLINIC | Age: 49
End: 2024-10-01
Payer: COMMERCIAL

## 2024-10-01 VITALS
BODY MASS INDEX: 33.29 KG/M2 | HEIGHT: 64 IN | HEART RATE: 63 BPM | DIASTOLIC BLOOD PRESSURE: 81 MMHG | WEIGHT: 195 LBS | OXYGEN SATURATION: 98 % | TEMPERATURE: 97 F | SYSTOLIC BLOOD PRESSURE: 138 MMHG

## 2024-10-01 DIAGNOSIS — R21 RASH AND NONSPECIFIC SKIN ERUPTION: Primary | ICD-10-CM

## 2024-10-01 PROBLEM — R59.0 LYMPHADENOPATHY, CERVICAL: Status: RESOLVED | Noted: 2024-07-22 | Resolved: 2024-10-01

## 2024-10-01 PROCEDURE — 99214 OFFICE O/P EST MOD 30 MIN: CPT | Performed by: STUDENT IN AN ORGANIZED HEALTH CARE EDUCATION/TRAINING PROGRAM

## 2024-10-01 RX ORDER — TRIAMCINOLONE ACETONIDE 0.25 MG/G
OINTMENT TOPICAL
Qty: 80 G | Refills: 1 | Status: SHIPPED | OUTPATIENT
Start: 2024-10-01 | End: 2024-10-08

## 2024-10-01 RX ORDER — CLINDAMYCIN HCL 300 MG
300 CAPSULE ORAL 3 TIMES DAILY
Qty: 21 CAPSULE | Refills: 0 | Status: SHIPPED | OUTPATIENT
Start: 2024-10-01 | End: 2024-10-08

## 2024-10-01 RX ORDER — PREDNISONE 20 MG/1
40 TABLET ORAL DAILY
Qty: 10 TABLET | Refills: 0 | Status: SHIPPED | OUTPATIENT
Start: 2024-10-01 | End: 2024-10-06

## 2024-10-01 ASSESSMENT — ENCOUNTER SYMPTOMS
DIARRHEA: 0
VOMITING: 0
COLOR CHANGE: 0
NAUSEA: 0

## 2024-10-01 NOTE — PROGRESS NOTES
10/1/2024     Asya Vásquez (:  1975) is a 49 y.o. female, here for evaluation of the following medical concerns:    HPI  Rash: Asya Vásquez is a 49 y.o. female who presents with a 5 day history of a localized rash.  Rash first presented on the bilateral lower leg and has spread to the surrounding area .  Started while she was in the [place] on vacation, but worsened over the weekend.  She got back on Friday. Rash is red and is pruritic.  Associated symptoms include none.  Patient has tried nothing.  New exposures: none.  Patient has not had contacts with similar symptoms.  Prior history of similar symptoms: no.    Review of Systems   Constitutional:  Negative for activity change, chills and fever.   Gastrointestinal:  Negative for diarrhea, nausea and vomiting.   Skin:  Positive for rash. Negative for color change and wound.   Hematological:  Negative for adenopathy.       Prior to Visit Medications    Medication Sig Taking? Authorizing Provider   triamcinolone (KENALOG) 0.025 % ointment Apply topically 2 times daily. Yes Gilles Lewis DO   clindamycin (CLEOCIN) 300 MG capsule Take 1 capsule by mouth 3 times daily for 7 days Yes Gilles Lewis DO   predniSONE (DELTASONE) 20 MG tablet Take 2 tablets by mouth daily for 5 days Yes Gilles Lewis DO   methylPREDNISolone (MEDROL DOSEPACK) 4 MG tablet Take by mouth. Yes Gilles Lewis DO   inFLIXimab (REMICADE) 100 MG injection Infuse 5 mg/kg intravenously Once every 6 weeks Yes Provider, MD Derek        Social History     Tobacco Use    Smoking status: Former     Current packs/day: 0.00     Average packs/day: 0.5 packs/day for 5.0 years (2.5 ttl pk-yrs)     Types: Cigarettes     Start date: 1994     Quit date: 1999     Years since quittin.9    Smokeless tobacco: Never   Substance Use Topics    Alcohol use: Yes     Comment: occasional        Vitals:    10/01/24 1219   BP: 138/81   Pulse: 63   Temp: 97 °F (36.1 °C)   SpO2: 98%

## 2024-10-09 RX ORDER — TRIAMCINOLONE ACETONIDE 0.25 MG/G
CREAM TOPICAL
Qty: 80 G | Refills: 0 | Status: SHIPPED | OUTPATIENT
Start: 2024-10-09

## 2024-10-14 ENCOUNTER — TELEPHONE (OUTPATIENT)
Dept: PRIMARY CARE CLINIC | Age: 49
End: 2024-10-14

## 2024-10-14 ENCOUNTER — OFFICE VISIT (OUTPATIENT)
Dept: PRIMARY CARE CLINIC | Age: 49
End: 2024-10-14
Payer: COMMERCIAL

## 2024-10-14 VITALS
HEIGHT: 64 IN | WEIGHT: 196 LBS | BODY MASS INDEX: 33.46 KG/M2 | SYSTOLIC BLOOD PRESSURE: 121 MMHG | TEMPERATURE: 97.2 F | HEART RATE: 67 BPM | DIASTOLIC BLOOD PRESSURE: 73 MMHG | OXYGEN SATURATION: 95 %

## 2024-10-14 DIAGNOSIS — R21 RASH AND NONSPECIFIC SKIN ERUPTION: ICD-10-CM

## 2024-10-14 DIAGNOSIS — R21 RASH AND NONSPECIFIC SKIN ERUPTION: Primary | ICD-10-CM

## 2024-10-14 PROCEDURE — 99214 OFFICE O/P EST MOD 30 MIN: CPT | Performed by: STUDENT IN AN ORGANIZED HEALTH CARE EDUCATION/TRAINING PROGRAM

## 2024-10-14 RX ORDER — BETAMETHASONE DIPROPIONATE 0.5 MG/G
CREAM TOPICAL
Qty: 50 G | Refills: 0 | Status: SHIPPED | OUTPATIENT
Start: 2024-10-14 | End: 2024-11-13

## 2024-10-14 RX ORDER — TRIAMCINOLONE ACETONIDE 0.25 MG/G
CREAM TOPICAL
Qty: 80 G | Refills: 0 | Status: SHIPPED | OUTPATIENT
Start: 2024-10-14

## 2024-10-14 RX ORDER — BETAMETHASONE DIPROPIONATE 0.5 MG/G
CREAM TOPICAL
Qty: 50 G | Refills: 0 | Status: CANCELLED | OUTPATIENT
Start: 2024-10-14 | End: 2024-11-13

## 2024-10-14 RX ORDER — BETAMETHASONE DIPROPIONATE 0.5 MG/G
CREAM TOPICAL
Qty: 50 G | Refills: 0 | Status: SHIPPED | OUTPATIENT
Start: 2024-10-14 | End: 2024-10-14 | Stop reason: SDUPTHER

## 2024-10-14 ASSESSMENT — ENCOUNTER SYMPTOMS
DIARRHEA: 0
NAUSEA: 0
VOMITING: 0
COLOR CHANGE: 0

## 2024-10-14 NOTE — TELEPHONE ENCOUNTER
Pt came back in stating the pharmacy is in need of the directions on the prescription to say the cream is a     triamcinolone (KENALOG) 0.025 % cream  augmented betamethasone dipropionate (DIPROLENE AF) 0.05 % cream     Topical cream for a 1 week supplies being applied to eduardo legs, arm and torso      Due to the sig not stating the patient is using the cream on more then one location it is needing to be corrected.

## 2024-10-14 NOTE — TELEPHONE ENCOUNTER
triamcinolone (KENALOG) 0.025 % cream     Pt states that the amount that is prescribed is only lasting 2 days  The pharmacy is asking for a new scrip with more in quantity. Pt states that she used this on both legs and on her torso daily. Pharmacy needs to know  how many grams to used per leg and torso  and how many grams per tube.     [id number] - [place], [place] - [address] - P [phone] - F [phone]  [address], [facility] [address]  Phone: [phone]  Fax: [phone]

## 2024-10-14 NOTE — TELEPHONE ENCOUNTER
Pharmacy states script need to state Topical cream for a 1 week supplies being applied to eduardo legs, arm and torso as well as saying it is ok to fill today

## 2024-10-14 NOTE — PROGRESS NOTES
10/14/2024     Asya Vásquez (:  1975) is a 49 y.o. female, here for evaluation of the following medical concerns:    HPI  Rash  Asya presents today for evaluation of a rash.  She was actually seen about 2 weeks ago for evaluation of the same rash.  She was on vacation in the [facility] and the day after she came home she noticed some small spots on her skin.  She called the on-call doctor, who sent her in some cream.  Unfortunately, the rash continued to spread.  She was seen in our clinic in the potency of the cream was increased.  She has been using it twice daily as instructed.  She has noticed the initial areas have become much less itchy and red, but the rashes continue to spread.  She has not noticed any new symptoms.  She does not have fever, chills, nausea, vomiting or diarrhea.    Review of Systems   Constitutional:  Negative for activity change, chills and fever.   Gastrointestinal:  Negative for diarrhea, nausea and vomiting.   Skin:  Positive for rash. Negative for color change and wound.   Hematological:  Negative for adenopathy.       Prior to Visit Medications    Medication Sig Taking? Authorizing Provider   augmented betamethasone dipropionate (DIPROLENE AF) 0.05 % cream Apply topically 2 times daily. Yes Gilles Lewis DO   triamcinolone (KENALOG) 0.025 % cream Apply topically 2 times daily. Yes Gilles Lewis DO   inFLIXimab (REMICADE) 100 MG injection Infuse 5 mg/kg intravenously Once every 6 weeks Yes Provider, MD Derek        Social History     Tobacco Use    Smoking status: Former     Current packs/day: 0.00     Average packs/day: 0.5 packs/day for 5.0 years (2.5 ttl pk-yrs)     Types: Cigarettes     Start date: 1994     Quit date: 1999     Years since quittin.9    Smokeless tobacco: Never   Substance Use Topics    Alcohol use: Yes     Comment: occasional        Vitals:    10/14/24 1559   BP: 121/73   Pulse: 67   Temp: 97.2 °F (36.2 °C)   TempSrc:

## 2024-10-23 ENCOUNTER — OFFICE VISIT (OUTPATIENT)
Dept: PRIMARY CARE CLINIC | Age: 49
End: 2024-10-23

## 2024-10-23 VITALS
DIASTOLIC BLOOD PRESSURE: 71 MMHG | HEART RATE: 79 BPM | HEIGHT: 64 IN | SYSTOLIC BLOOD PRESSURE: 119 MMHG | WEIGHT: 198 LBS | BODY MASS INDEX: 33.8 KG/M2 | TEMPERATURE: 97.2 F

## 2024-10-23 DIAGNOSIS — J32.9 BACTERIAL SINUSITIS: Primary | ICD-10-CM

## 2024-10-23 DIAGNOSIS — B96.89 BACTERIAL SINUSITIS: Primary | ICD-10-CM

## 2024-10-23 DIAGNOSIS — R68.89 FLU-LIKE SYMPTOMS: ICD-10-CM

## 2024-10-23 LAB
INFLUENZA A ANTIBODY: NORMAL
INFLUENZA B ANTIBODY: NORMAL
S PYO AG THROAT QL: NORMAL

## 2024-10-23 RX ORDER — DOXYCYCLINE HYCLATE 100 MG
100 TABLET ORAL 2 TIMES DAILY
Qty: 14 TABLET | Refills: 0 | Status: SHIPPED | OUTPATIENT
Start: 2024-10-23 | End: 2024-10-30

## 2024-10-23 RX ORDER — PREDNISONE 20 MG/1
40 TABLET ORAL EVERY MORNING
Qty: 10 TABLET | Refills: 0 | Status: SHIPPED | OUTPATIENT
Start: 2024-10-23 | End: 2024-10-28

## 2024-10-23 ASSESSMENT — ENCOUNTER SYMPTOMS
SHORTNESS OF BREATH: 0
SORE THROAT: 0
RHINORRHEA: 1
COUGH: 1
VOMITING: 0
DIARRHEA: 0
EYE PAIN: 0
SINUS PRESSURE: 1
WHEEZING: 0
NAUSEA: 0

## 2024-10-23 NOTE — PROGRESS NOTES
[facility]  10/23/2024    Asya Vásquez (:  1975) is a 49 y.o. female, here for evaluation of the following medical concerns:    Chief Complaint   Patient presents with    Other     Sick visit, body aches,fatigue,sore throat,cough since Monday. Covid negative as of this morning         ASSESSMENT/ PLAN  1. Bacterial sinusitis  Uncontrolled, this is a new problem.  Flu, COVID and strep testing negative.  Due to penicillin allergy will initiate treatment with doxycycline and prednisone.  Follow-up in 1 week if persistent or worsening symptoms  - doxycycline hyclate (VIBRA-TABS) 100 MG tablet; Take 1 tablet by mouth 2 times daily for 7 days  Dispense: 14 tablet; Refill: 0  - predniSONE (DELTASONE) 20 MG tablet; Take 2 tablets by mouth every morning for 5 days  Dispense: 10 tablet; Refill: 0    2. Flu-like symptoms  As above  - POCT Influenza A/B  - POCT rapid strep A       Return if symptoms worsen or fail to improve.    HPI  Patient presents today for purulent rhinorrhea, upper respiratory congestion, fever and chills.    Symptoms have been persistent for the past week.  She has taken a home COVID test which was negative.  She has also been using ibuprofen, Tylenol, DayQuil, NyQuil routinely since the onset of symptoms.  She denies chest pain, shortness of breath, or wheezing.  + Productive cough.    ROS  Review of Systems   Constitutional:  Positive for chills. Negative for activity change, appetite change, fatigue and fever.   HENT:  Positive for congestion, rhinorrhea and sinus pressure. Negative for ear pain and sore throat.    Eyes:  Negative for pain.   Respiratory:  Positive for cough. Negative for shortness of breath and wheezing.    Cardiovascular:  Negative for chest pain.   Gastrointestinal:  Negative for diarrhea, nausea and vomiting.   Genitourinary:  Negative for decreased urine volume.   Musculoskeletal:  Negative for myalgias.   Neurological:  Negative for headaches.

## 2025-06-05 ASSESSMENT — PATIENT HEALTH QUESTIONNAIRE - PHQ9
SUM OF ALL RESPONSES TO PHQ QUESTIONS 1-9: 0
1. LITTLE INTEREST OR PLEASURE IN DOING THINGS: NOT AT ALL
SUM OF ALL RESPONSES TO PHQ9 QUESTIONS 1 & 2: 0
SUM OF ALL RESPONSES TO PHQ QUESTIONS 1-9: 0
2. FEELING DOWN, DEPRESSED OR HOPELESS: NOT AT ALL
1. LITTLE INTEREST OR PLEASURE IN DOING THINGS: NOT AT ALL
2. FEELING DOWN, DEPRESSED OR HOPELESS: NOT AT ALL

## 2025-06-11 ENCOUNTER — OFFICE VISIT (OUTPATIENT)
Dept: PRIMARY CARE CLINIC | Age: 50
End: 2025-06-11
Payer: COMMERCIAL

## 2025-06-11 VITALS
DIASTOLIC BLOOD PRESSURE: 71 MMHG | HEIGHT: 64 IN | HEART RATE: 73 BPM | WEIGHT: 198.6 LBS | SYSTOLIC BLOOD PRESSURE: 115 MMHG | BODY MASS INDEX: 33.9 KG/M2 | TEMPERATURE: 97.3 F

## 2025-06-11 DIAGNOSIS — Z12.31 ENCOUNTER FOR SCREENING MAMMOGRAM FOR MALIGNANT NEOPLASM OF BREAST: ICD-10-CM

## 2025-06-11 DIAGNOSIS — N95.1 MENOPAUSAL HOT FLUSHES: ICD-10-CM

## 2025-06-11 DIAGNOSIS — Z11.4 ENCOUNTER FOR SCREENING FOR HUMAN IMMUNODEFICIENCY VIRUS (HIV): ICD-10-CM

## 2025-06-11 DIAGNOSIS — Z00.00 ANNUAL PHYSICAL EXAM: Primary | ICD-10-CM

## 2025-06-11 DIAGNOSIS — E66.09 OBESITY DUE TO EXCESS CALORIES WITH SERIOUS COMORBIDITY, UNSPECIFIED CLASS: ICD-10-CM

## 2025-06-11 DIAGNOSIS — Z00.00 ANNUAL PHYSICAL EXAM: ICD-10-CM

## 2025-06-11 DIAGNOSIS — Z11.59 ENCOUNTER FOR HEPATITIS C SCREENING TEST FOR LOW RISK PATIENT: ICD-10-CM

## 2025-06-11 PROCEDURE — 99396 PREV VISIT EST AGE 40-64: CPT | Performed by: STUDENT IN AN ORGANIZED HEALTH CARE EDUCATION/TRAINING PROGRAM

## 2025-06-11 PROCEDURE — 90677 PCV20 VACCINE IM: CPT | Performed by: STUDENT IN AN ORGANIZED HEALTH CARE EDUCATION/TRAINING PROGRAM

## 2025-06-11 PROCEDURE — 99213 OFFICE O/P EST LOW 20 MIN: CPT | Performed by: STUDENT IN AN ORGANIZED HEALTH CARE EDUCATION/TRAINING PROGRAM

## 2025-06-11 PROCEDURE — 90471 IMMUNIZATION ADMIN: CPT | Performed by: STUDENT IN AN ORGANIZED HEALTH CARE EDUCATION/TRAINING PROGRAM

## 2025-06-11 RX ORDER — ESTROGEN,CON/M-PROGEST ACET 0.3-1.5MG
1 TABLET ORAL DAILY
Qty: 90 TABLET | Refills: 1 | Status: SHIPPED | OUTPATIENT
Start: 2025-06-11

## 2025-06-11 SDOH — ECONOMIC STABILITY: FOOD INSECURITY: WITHIN THE PAST 12 MONTHS, THE FOOD YOU BOUGHT JUST DIDN'T LAST AND YOU DIDN'T HAVE MONEY TO GET MORE.: NEVER TRUE

## 2025-06-11 SDOH — ECONOMIC STABILITY: FOOD INSECURITY: WITHIN THE PAST 12 MONTHS, YOU WORRIED THAT YOUR FOOD WOULD RUN OUT BEFORE YOU GOT MONEY TO BUY MORE.: NEVER TRUE

## 2025-06-11 ASSESSMENT — ENCOUNTER SYMPTOMS
DIARRHEA: 0
SHORTNESS OF BREATH: 0
COUGH: 0
CONSTIPATION: 0

## 2025-06-11 NOTE — PROGRESS NOTES
[facility]  2025    Asya Vásquez (:  1975) is a 49 y.o. female, here for evaluation of the following medical concerns:    Chief Complaint   Patient presents with    Annual Exam        ASSESSMENT/ PLAN  1. Annual physical exam  Screening labs ordered today if indicated, recommend 150 minutes of exercise weekly and healthy dietary choices.  Pertinent cancer screening and immunizations recommended/discussed with the patient, and orders placed or deferred per patient consent.    - CBC with Auto Differential; Future  - Comprehensive Metabolic Panel; Future  - Lipid Panel; Future  - Hemoglobin A1C; Future  - TSH reflex to FT4; Future    2. Encounter for screening mammogram for malignant neoplasm of breast  - SYEDA DIGITAL SCREEN W OR WO CAD BILATERAL; Future    3. Obesity due to excess calories with serious comorbidity, unspecified class  Complicates all aspects of patient's care, initiate Zepbound.  Risks, benefits and side effects of GLP-1 discussed.  May increase monthly as tolerated.  - tirzepatide-weight management (ZEPBOUND) 2.5 MG/0.5ML SOAJ subCUTAneous auto-injector pen; Inject 2.5 mg into the skin every 7 days  Dispense: 2 mL; Refill: 0    4. Menopausal hot flushes  Uncontrolled, this is a new problem.  Update hormonal testing and initiate HRT  - estrogen, conjugated,-medroxyPROGESTERone (PREMPRO) 0.3-1.5 MG per tablet; Take 1 tablet by mouth daily  Dispense: 90 tablet; Refill: 1  - Follicle Stimulating Hormone; Future  - Estradiol; Future    5. Encounter for hepatitis C screening test for low risk patient  - Hepatitis C Antibody; Future    6. Encounter for screening for human immunodeficiency virus (HIV)  - HIV Screen; Future     Return in about 3 months (around 2025) for HRT, weight management .    HPI  Patient presents today for annual physical exam.    Would like to initiate Zepbound at this time.  Has optimized diet recommendations and exercises at least 5 times a week

## 2025-06-11 NOTE — TELEPHONE ENCOUNTER
Medication:   Requested Prescriptions     Pending Prescriptions Disp Refills    ZEPBOUND 2.5 MG/0.5ML SOAJ subCUTAneous auto-injector pen [Pharmacy Med Name: ZEPBOUND PEN 0.5ML 4'S 2.5MG] 6 mL 3     Sig: INJECT 2.5 MG UNDER THE SKIN ONCE A WEEK       Last Filled:  pharmacy requesting a 90 days supplies     Patient Phone Number: [phone] (home) [phone] (work)    Last appt: 6/11/2025   Next appt: Visit date not found    Last Labs DM:   Lab Results   Component Value Date/Time    LABA1C 5.3 04/29/2021 07:26 AM

## 2025-06-12 ENCOUNTER — RESULTS FOLLOW-UP (OUTPATIENT)
Dept: PRIMARY CARE CLINIC | Age: 50
End: 2025-06-12

## 2025-06-12 ENCOUNTER — TELEPHONE (OUTPATIENT)
Dept: PRIMARY CARE CLINIC | Age: 50
End: 2025-06-12

## 2025-06-12 LAB
ALBUMIN SERPL-MCNC: 4.4 G/DL (ref 3.4–5)
ALBUMIN/GLOB SERPL: 1.4 {RATIO} (ref 1.1–2.2)
ALP SERPL-CCNC: 77 U/L (ref 40–129)
ALT SERPL-CCNC: 21 U/L (ref 10–40)
ANION GAP SERPL CALCULATED.3IONS-SCNC: 9 MMOL/L (ref 3–16)
AST SERPL-CCNC: 27 U/L (ref 15–37)
BASOPHILS # BLD: 0 K/UL (ref 0–0.2)
BASOPHILS NFR BLD: 0.6 %
BILIRUB SERPL-MCNC: 0.7 MG/DL (ref 0–1)
BUN SERPL-MCNC: 21 MG/DL (ref 7–20)
CALCIUM SERPL-MCNC: 10 MG/DL (ref 8.3–10.6)
CHLORIDE SERPL-SCNC: 100 MMOL/L (ref 99–110)
CHOLEST SERPL-MCNC: 206 MG/DL (ref 0–199)
CO2 SERPL-SCNC: 30 MMOL/L (ref 21–32)
CREAT SERPL-MCNC: 0.9 MG/DL (ref 0.6–1.1)
DEPRECATED RDW RBC AUTO: 15.9 % (ref 12.4–15.4)
EOSINOPHIL # BLD: 0.1 K/UL (ref 0–0.6)
EOSINOPHIL NFR BLD: 1.3 %
EST. AVERAGE GLUCOSE BLD GHB EST-MCNC: 108.3 MG/DL
ESTRADIOL SERPL-MCNC: <5 PG/ML
FSH SERPL-ACNC: 84.5 MIU/ML
GFR SERPLBLD CREATININE-BSD FMLA CKD-EPI: 77 ML/MIN/{1.73_M2}
GLUCOSE SERPL-MCNC: 91 MG/DL (ref 70–99)
HBA1C MFR BLD: 5.4 %
HCT VFR BLD AUTO: 39.5 % (ref 36–48)
HDLC SERPL-MCNC: 67 MG/DL (ref 40–60)
HGB BLD-MCNC: 13.2 G/DL (ref 12–16)
LDLC SERPL CALC-MCNC: 103 MG/DL
LYMPHOCYTES # BLD: 3.7 K/UL (ref 1–5.1)
LYMPHOCYTES NFR BLD: 44.9 %
MAMMOGRAPHY, EXTERNAL: NEGATIVE
MCH RBC QN AUTO: 28.9 PG (ref 26–34)
MCHC RBC AUTO-ENTMCNC: 33.5 G/DL (ref 31–36)
MCV RBC AUTO: 86.2 FL (ref 80–100)
MONOCYTES # BLD: 0.5 K/UL (ref 0–1.3)
MONOCYTES NFR BLD: 5.5 %
NEUTROPHILS # BLD: 3.9 K/UL (ref 1.7–7.7)
NEUTROPHILS NFR BLD: 47.7 %
PLATELET # BLD AUTO: 298 K/UL (ref 135–450)
PMV BLD AUTO: 9.1 FL (ref 5–10.5)
POTASSIUM SERPL-SCNC: 4.5 MMOL/L (ref 3.5–5.1)
PROT SERPL-MCNC: 7.6 G/DL (ref 6.4–8.2)
RBC # BLD AUTO: 4.58 M/UL (ref 4–5.2)
SODIUM SERPL-SCNC: 139 MMOL/L (ref 136–145)
TRIGL SERPL-MCNC: 180 MG/DL (ref 0–150)
TSH SERPL DL<=0.005 MIU/L-ACNC: 0.8 UIU/ML (ref 0.27–4.2)
VLDLC SERPL CALC-MCNC: 36 MG/DL
WBC # BLD AUTO: 8.2 K/UL (ref 4–11)

## 2025-06-12 RX ORDER — TIRZEPATIDE 2.5 MG/.5ML
INJECTION, SOLUTION SUBCUTANEOUS
Qty: 6 ML | Refills: 3 | Status: SHIPPED | OUTPATIENT
Start: 2025-06-12

## 2025-06-12 NOTE — TELEPHONE ENCOUNTER
Pharmacy called- insurance wont cover estrogen, conjugated,-medroxyPROGESTERone (PREMPRO) 0.3-1.5 MG per tablet     Until others are tried  Alternatives are    Medroxyprogesterone 2.5 mg    Estradial tabs 28 strength   1 over 5 mg   .5 over 1 mg    [place] es tabs pack 28 one over five

## 2025-06-12 NOTE — TELEPHONE ENCOUNTER
estrogen, conjugated,-medroxyPROGESTERone (PREMPRO) 0.3-1.5 MG per tablet [[id number]]  Order Details  Dose: 1 tablet Route: Oral Frequency: DAILY  Dispense Quantity: 90 tablet Refills: 1        Sig: Take 1 tablet by mouth daily       Start Date: 06/11/25 End Date: --  Written Date: 06/11/25 Expiration Date: 06/11/26     Associated Diagnoses: Menopausal hot flushes [N95.1]  Providers  Authorizing Provider: Tayla Stanford DO NPI: [id number]  Ordering User: Tayla Stanford DO   Pharmacy  [facility] - [address] -  [phone] - F [phone]  [address] [place] [address]  Phone: [phone]  Fax: [phone]

## 2025-06-12 NOTE — TELEPHONE ENCOUNTER
Can you submit a PA for the Prempro so I can get the paperwork that states specifically what alternatives are covered.

## 2025-06-13 NOTE — TELEPHONE ENCOUNTER
Submitted PA for Prempro 0.3-1.5MG tablets   Via CM Key: [id number]  STATUS: PENDING.    Follow up done daily; if no decision with in three days we will refax.  If another three days goes by with no decision will call the insurance for status.

## 2025-06-16 DIAGNOSIS — L71.9 ROSACEA: Primary | ICD-10-CM

## 2025-06-16 RX ORDER — DOXYCYCLINE HYCLATE 50 MG/1
50 TABLET, FILM COATED ORAL DAILY
Qty: 90 TABLET | Refills: 3 | Status: SHIPPED | OUTPATIENT
Start: 2025-06-16

## 2025-06-16 NOTE — TELEPHONE ENCOUNTER
Medication:   Requested Prescriptions     Pending Prescriptions Disp Refills    Doxycycline Hyclate 50 MG TABS [Pharmacy Med Name: DOXYCYCLINE HYCLATE TABS 50MG]  0     Sig: Take 50 mg by mouth        Last Filled:      Patient Phone Number: [phone] (home) [phone] (work)    Last appt: 6/11/2025   Next appt: 9/17/2025    Last OARRS:        No data to display                Return in about 3 months (around 9/11/2025) for HRT, weight management .

## 2025-06-17 NOTE — TELEPHONE ENCOUNTER
Called plan @ [phone].  Spoke with Divina , medication is still pending review.     Case ID  # [id number]

## 2025-06-18 NOTE — TELEPHONE ENCOUNTER
Received the attached form from [employer] with additional questions regarding the PA for Prempro.  Questions answered and completed form faxed back to [employer].

## 2025-06-24 NOTE — TELEPHONE ENCOUNTER
DENIAL for Prempro 0.3-1.5MG tablets; letter attached in this encounter and under the Media tab.    Coverage is provided in situations where the patient has tried and, according to the prescriber, has experienced inadequate efficacy OR significant intolerance with at least one formulary alternative from EACH of the following groups: 1. Femhrt (including generics of Femhrt: Jinteli, Fyavolv, norethindrone-ethinyl estradiol) (formulary alternatives include: Jinteli, Fyavolv, norethindrone-ethinyl estradiol); and 2. Activella (including generics of Activella: Mimvey and estradiol-norethindrone) (formulary alternatives include: Mimvey and estradiol-norethindrone).    If this requires a response please respond to the pool ( P MHCX PSC MEDICATION PRE-AUTH).      Thank you please advise patient.

## 2025-06-25 DIAGNOSIS — N95.1 MENOPAUSAL HOT FLUSHES: ICD-10-CM

## 2025-06-25 DIAGNOSIS — N95.1 MENOPAUSAL HOT FLUSHES: Primary | ICD-10-CM

## 2025-06-25 RX ORDER — NORETHINDRONE ACETATE AND ETHINYL ESTRADIOL 1; 5 MG/1; UG/1
1 TABLET ORAL DAILY
Qty: 28 TABLET | Refills: 3 | Status: SHIPPED | OUTPATIENT
Start: 2025-06-25 | End: 2025-06-25 | Stop reason: SDUPTHER

## 2025-06-25 RX ORDER — NORETHINDRONE ACETATE AND ETHINYL ESTRADIOL 1; 5 MG/1; UG/1
1 TABLET ORAL DAILY
Qty: 84 TABLET | Refills: 3 | Status: SHIPPED | OUTPATIENT
Start: 2025-06-25

## 2025-06-25 NOTE — TELEPHONE ENCOUNTER
Medication:   Requested Prescriptions     Pending Prescriptions Disp Refills    norethindrone-ethinyl estradiol (JINTELI, FYAVOLV) 1-5 MG-MCG TABS per tablet 28 tablet 3     Sig: Take 1 tablet by mouth daily        Last Filled:  [facility] 90 days     Patient Phone Number: [phone] (home) [phone] (work)    Last appt: 6/11/2025   Next appt: 9/17/2025    Last OARRS:        No data to display

## 2025-07-22 DIAGNOSIS — Z13.820 OSTEOPOROSIS SCREENING: Primary | ICD-10-CM

## (undated) DEVICE — FORCEPS BX 240CM 2.4MM L NDL RAD JAW 4 M00513334

## (undated) DEVICE — ENDOSCOPY KIT: Brand: MEDLINE INDUSTRIES, INC.

## (undated) DEVICE — FORMALIN CLEAR VIAL 20 ML 10%